# Patient Record
Sex: MALE | Race: WHITE | NOT HISPANIC OR LATINO | Employment: PART TIME | URBAN - METROPOLITAN AREA
[De-identification: names, ages, dates, MRNs, and addresses within clinical notes are randomized per-mention and may not be internally consistent; named-entity substitution may affect disease eponyms.]

---

## 2018-06-14 ENCOUNTER — HOSPITAL ENCOUNTER (EMERGENCY)
Facility: HOSPITAL | Age: 43
Discharge: HOME | End: 2018-06-15
Attending: EMERGENCY MEDICINE | Admitting: EMERGENCY MEDICINE

## 2018-06-14 DIAGNOSIS — B16.9 ACUTE HEPATITIS B: Primary | ICD-10-CM

## 2018-06-14 PROCEDURE — 99283 EMERGENCY DEPT VISIT LOW MDM: CPT

## 2018-06-14 RX ORDER — SODIUM CHLORIDE 0.9 % (FLUSH) 0.9 %
10 SYRINGE (ML) INJECTION AS NEEDED
Status: DISCONTINUED | OUTPATIENT
Start: 2018-06-14 | End: 2018-06-15 | Stop reason: HOSPADM

## 2018-06-15 ENCOUNTER — APPOINTMENT (OUTPATIENT)
Dept: ULTRASOUND IMAGING | Facility: HOSPITAL | Age: 43
End: 2018-06-15

## 2018-06-15 VITALS
BODY MASS INDEX: 40.42 KG/M2 | SYSTOLIC BLOOD PRESSURE: 124 MMHG | HEART RATE: 78 BPM | TEMPERATURE: 96.7 F | RESPIRATION RATE: 14 BRPM | DIASTOLIC BLOOD PRESSURE: 85 MMHG | OXYGEN SATURATION: 96 % | HEIGHT: 73 IN | WEIGHT: 305 LBS

## 2018-06-15 LAB
ALBUMIN SERPL-MCNC: 4 G/DL (ref 3.5–5.2)
ALBUMIN/GLOB SERPL: 1.4 G/DL
ALP SERPL-CCNC: 216 U/L (ref 39–117)
ALT SERPL W P-5'-P-CCNC: 1468 U/L (ref 1–41)
ANION GAP SERPL CALCULATED.3IONS-SCNC: 11.3 MMOL/L
APTT PPP: 30.1 SECONDS (ref 22.7–35.4)
AST SERPL-CCNC: 584 U/L (ref 1–40)
BASOPHILS # BLD AUTO: 0.02 10*3/MM3 (ref 0–0.2)
BASOPHILS NFR BLD AUTO: 0.3 % (ref 0–1.5)
BILIRUB SERPL-MCNC: 1.7 MG/DL (ref 0.1–1.2)
BILIRUB UR QL STRIP: NEGATIVE
BUN BLD-MCNC: 17 MG/DL (ref 6–20)
BUN/CREAT SERPL: 18.7 (ref 7–25)
CALCIUM SPEC-SCNC: 9.3 MG/DL (ref 8.6–10.5)
CHLORIDE SERPL-SCNC: 101 MMOL/L (ref 98–107)
CLARITY UR: CLEAR
CO2 SERPL-SCNC: 25.7 MMOL/L (ref 22–29)
COLOR UR: YELLOW
CREAT BLD-MCNC: 0.91 MG/DL (ref 0.76–1.27)
DEPRECATED RDW RBC AUTO: 45.2 FL (ref 37–54)
EOSINOPHIL # BLD AUTO: 0.1 10*3/MM3 (ref 0–0.7)
EOSINOPHIL NFR BLD AUTO: 1.5 % (ref 0.3–6.2)
ERYTHROCYTE [DISTWIDTH] IN BLOOD BY AUTOMATED COUNT: 13.6 % (ref 11.5–14.5)
GFR SERPL CREATININE-BSD FRML MDRD: 91 ML/MIN/1.73
GLOBULIN UR ELPH-MCNC: 2.8 GM/DL
GLUCOSE BLD-MCNC: 190 MG/DL (ref 65–99)
GLUCOSE UR STRIP-MCNC: ABNORMAL MG/DL
HAV IGM SERPL QL IA: ABNORMAL
HBV CORE IGM SERPL QL IA: REACTIVE
HBV SURFACE AG SERPL QL IA: REACTIVE
HCT VFR BLD AUTO: 48.3 % (ref 40.4–52.2)
HCV AB SER DONR QL: ABNORMAL
HGB BLD-MCNC: 16.4 G/DL (ref 13.7–17.6)
HGB UR QL STRIP.AUTO: NEGATIVE
IMM GRANULOCYTES # BLD: 0 10*3/MM3 (ref 0–0.03)
IMM GRANULOCYTES NFR BLD: 0 % (ref 0–0.5)
INR PPP: 1.14 (ref 0.9–1.1)
KETONES UR QL STRIP: NEGATIVE
LEUKOCYTE ESTERASE UR QL STRIP.AUTO: NEGATIVE
LYMPHOCYTES # BLD AUTO: 2.78 10*3/MM3 (ref 0.9–4.8)
LYMPHOCYTES NFR BLD AUTO: 40.5 % (ref 19.6–45.3)
MCH RBC QN AUTO: 31.2 PG (ref 27–32.7)
MCHC RBC AUTO-ENTMCNC: 34 G/DL (ref 32.6–36.4)
MCV RBC AUTO: 92 FL (ref 79.8–96.2)
MONOCYTES # BLD AUTO: 0.65 10*3/MM3 (ref 0.2–1.2)
MONOCYTES NFR BLD AUTO: 9.5 % (ref 5–12)
NEUTROPHILS # BLD AUTO: 3.31 10*3/MM3 (ref 1.9–8.1)
NEUTROPHILS NFR BLD AUTO: 48.2 % (ref 42.7–76)
NITRITE UR QL STRIP: NEGATIVE
PH UR STRIP.AUTO: 5.5 [PH] (ref 5–8)
PLATELET # BLD AUTO: 193 10*3/MM3 (ref 140–500)
PMV BLD AUTO: 9.9 FL (ref 6–12)
POTASSIUM BLD-SCNC: 4.4 MMOL/L (ref 3.5–5.2)
PROT SERPL-MCNC: 6.8 G/DL (ref 6–8.5)
PROT UR QL STRIP: NEGATIVE
PROTHROMBIN TIME: 14.4 SECONDS (ref 11.7–14.2)
RBC # BLD AUTO: 5.25 10*6/MM3 (ref 4.6–6)
SODIUM BLD-SCNC: 138 MMOL/L (ref 136–145)
SP GR UR STRIP: 1.01 (ref 1–1.03)
UROBILINOGEN UR QL STRIP: ABNORMAL
WBC NRBC COR # BLD: 6.86 10*3/MM3 (ref 4.5–10.7)

## 2018-06-15 PROCEDURE — 85025 COMPLETE CBC W/AUTO DIFF WBC: CPT | Performed by: EMERGENCY MEDICINE

## 2018-06-15 PROCEDURE — 81003 URINALYSIS AUTO W/O SCOPE: CPT | Performed by: EMERGENCY MEDICINE

## 2018-06-15 PROCEDURE — 80074 ACUTE HEPATITIS PANEL: CPT | Performed by: EMERGENCY MEDICINE

## 2018-06-15 PROCEDURE — 85610 PROTHROMBIN TIME: CPT | Performed by: EMERGENCY MEDICINE

## 2018-06-15 PROCEDURE — 80053 COMPREHEN METABOLIC PANEL: CPT | Performed by: EMERGENCY MEDICINE

## 2018-06-15 PROCEDURE — 76705 ECHO EXAM OF ABDOMEN: CPT

## 2018-06-15 PROCEDURE — 85730 THROMBOPLASTIN TIME PARTIAL: CPT | Performed by: EMERGENCY MEDICINE

## 2018-06-15 RX ORDER — ONDANSETRON 4 MG/1
4 TABLET, FILM COATED ORAL EVERY 6 HOURS
Qty: 10 TABLET | Refills: 0 | Status: SHIPPED | OUTPATIENT
Start: 2018-06-15

## 2018-06-15 NOTE — ED PROVIDER NOTES
" EMERGENCY DEPARTMENT ENCOUNTER    CHIEF COMPLAINT  Chief Complaint: fatigue, nausea  History given by: patient  History limited by: nothing  Room Number: 34/34  PMD: Antonio Murray MD      HPI:  Pt is a 42 y.o. male who presents complaining of fatigue and nausea for the last 2.5 weeks. Pt also complains of dark urine, diaphoresis, abd pain, lower back pain and bilateral flank pain but denies fever, diarrhea. Pt states that he developed vomiting earlier today, which is why he came to the ED. Pt states that he is concerned that he has Hepatitis B since his significant other was recently diagnosed with Hepatitis B. Pt states that he had the Hepatitis B vaccinations \"a long time ago\".    Duration:  2.5 weeks  Onset: gradual  Timing: constant  Location: generalized  Radiation: N/A  Quality: fatigue  Intensity/Severity: moderate  Progression: worsening  Associated Symptoms: nausea, dark urine, abd pain, diaphoresis, lower back pain, bilateral flank pain, vomiting  Aggravating Factors: none  Alleviating Factors: none  Previous Episodes: Pt denies having similar symptoms previously.  Treatment before arrival: none    PAST MEDICAL HISTORY  Active Ambulatory Problems     Diagnosis Date Noted   • No Active Ambulatory Problems     Resolved Ambulatory Problems     Diagnosis Date Noted   • No Resolved Ambulatory Problems     Past Medical History:   Diagnosis Date   • Diabetes mellitus    • Hyperlipidemia        PAST SURGICAL HISTORY  Past Surgical History:   Procedure Laterality Date   • BACK SURGERY         FAMILY HISTORY  History reviewed. No pertinent family history.    SOCIAL HISTORY  Social History     Social History   • Marital status:      Spouse name: N/A   • Number of children: N/A   • Years of education: N/A     Occupational History   • Not on file.     Social History Main Topics   • Smoking status: Current Every Day Smoker     Packs/day: 1.00   • Smokeless tobacco: Not on file   • Alcohol use No "   • Drug use: No   • Sexual activity: Not on file     Other Topics Concern   • Not on file     Social History Narrative   • No narrative on file       ALLERGIES  Codeine    REVIEW OF SYSTEMS  Review of Systems   Constitutional: Positive for diaphoresis and fatigue. Negative for activity change, appetite change and fever.   HENT: Negative for congestion and sore throat.    Eyes: Negative.    Respiratory: Negative for cough and shortness of breath.    Cardiovascular: Negative for chest pain and leg swelling.   Gastrointestinal: Positive for abdominal pain, nausea and vomiting. Negative for diarrhea.   Endocrine: Negative.    Genitourinary: Positive for flank pain (bilateral flanks). Negative for decreased urine volume and dysuria.        Dark urine   Musculoskeletal: Positive for back pain. Negative for neck pain.   Skin: Negative for rash and wound.   Allergic/Immunologic: Negative.    Neurological: Negative for weakness, numbness and headaches.   Hematological: Negative.    Psychiatric/Behavioral: Negative.    All other systems reviewed and are negative.      PHYSICAL EXAM  ED Triage Vitals   Temp Heart Rate Resp BP SpO2   06/14/18 2344 06/14/18 2344 06/14/18 2344 06/14/18 2348 06/14/18 2344   96.7 °F (35.9 °C) 91 16 130/89 96 %      Temp src Heart Rate Source Patient Position BP Location FiO2 (%)   06/14/18 2344 06/14/18 2344 -- 06/14/18 2348 --   Tympanic Monitor  Right arm        Physical Exam   Constitutional: He is oriented to person, place, and time. He does not have a sickly appearance. No distress.   HENT:   Head: Normocephalic and atraumatic.   Eyes: EOM are normal. Pupils are equal, round, and reactive to light. No scleral icterus.   Neck: Normal range of motion. Neck supple.   Cardiovascular: Normal rate, regular rhythm and normal heart sounds.    Pulmonary/Chest: Effort normal and breath sounds normal. No respiratory distress.   Abdominal: Soft. There is no tenderness. There is no rebound and no  guarding.   obese   Musculoskeletal: Normal range of motion. He exhibits no edema.   Neurological: He is alert and oriented to person, place, and time. He has normal sensation and normal strength.   Skin: Skin is warm and dry. Rash (lower abdomen c/w allergic dermatitis) noted.   Psychiatric: Mood and affect normal.   Nursing note and vitals reviewed.      LAB RESULTS  Lab Results (last 24 hours)     Procedure Component Value Units Date/Time    CBC & Differential [[id number]] Collected:  06/15/18 0003    Specimen:  Blood Updated:  06/15/18 0027    Narrative:       The following orders were created for panel order CBC & Differential.  Procedure                               Abnormality         Status                     ---------                               -----------         ------                     CBC Auto Differential[[id number]]        Normal              Final result                 Please view results for these tests on the individual orders.    Comprehensive Metabolic Panel [[id number]]  (Abnormal) Collected:  06/15/18 0003    Specimen:  Blood Updated:  06/15/18 0045     Glucose 190 (H) mg/dL      BUN 17 mg/dL      Creatinine 0.91 mg/dL      Sodium 138 mmol/L      Potassium 4.4 mmol/L      Chloride 101 mmol/L      CO2 25.7 mmol/L      Calcium 9.3 mg/dL      Total Protein 6.8 g/dL      Albumin 4.00 g/dL      ALT (SGPT) 1,468 (H) U/L      AST (SGOT) 584 (H) U/L      Alkaline Phosphatase 216 (H) U/L      Total Bilirubin 1.7 (H) mg/dL      eGFR Non African Amer 91 mL/min/1.73      Globulin 2.8 gm/dL      A/G Ratio 1.4 g/dL      BUN/Creatinine Ratio 18.7     Anion Gap 11.3 mmol/L     CBC Auto Differential [[id number]]  (Normal) Collected:  06/15/18 0003    Specimen:  Blood Updated:  06/15/18 0027     WBC 6.86 10*3/mm3      RBC 5.25 10*6/mm3      Hemoglobin 16.4 g/dL      Hematocrit 48.3 %      MCV 92.0 fL      MCH 31.2 pg      MCHC 34.0 g/dL      RDW 13.6 %      RDW-SD 45.2 fl      MPV 9.9 fL      Platelets 193  10*3/mm3      Neutrophil % 48.2 %      Lymphocyte % 40.5 %      Monocyte % 9.5 %      Eosinophil % 1.5 %      Basophil % 0.3 %      Immature Grans % 0.0 %      Neutrophils, Absolute 3.31 10*3/mm3      Lymphocytes, Absolute 2.78 10*3/mm3      Monocytes, Absolute 0.65 10*3/mm3      Eosinophils, Absolute 0.10 10*3/mm3      Basophils, Absolute 0.02 10*3/mm3      Immature Grans, Absolute 0.00 10*3/mm3     Urinalysis With / Microscopic If Indicated (No Culture) - Urine, Clean Catch [[id number]]  (Abnormal) Collected:  06/15/18 0004    Specimen:  Urine from Urine, Clean Catch Updated:  06/15/18 0050     Color, UA Yellow     Appearance, UA Clear     pH, UA 5.5     Specific Gravity, UA 1.012     Glucose,  mg/dL (2+) (A)     Ketones, UA Negative     Bilirubin, UA Negative     Blood, UA Negative     Protein, UA Negative     Leuk Esterase, UA Negative     Nitrite, UA Negative     Urobilinogen, UA 2.0 E.U./dL (A)    Narrative:       Urine microscopic not indicated.    Hepatitis Panel, Acute [[id number]] Collected:  06/15/18 0015    Specimen:  Blood Updated:  06/15/18 0042    aPTT [[id number]]  (Normal) Collected:  06/15/18 0209    Specimen:  Blood Updated:  06/15/18 0236     PTT 30.1 seconds     Protime-INR [[id number]]  (Abnormal) Collected:  06/15/18 0209    Specimen:  Blood Updated:  06/15/18 0236     Protime 14.4 (H) Seconds      INR 1.14 (H)          I ordered the above labs and reviewed the results    RADIOLOGY  US Gallbladder   Final Result   1. Heavily contracted gallbladder without obvious gallstones.   2. Diffuse fatty liver where visualized       This report was finalized on 6/15/2018 2:24 AM by Jackson Narvaez M.D.             I ordered the above noted radiological studies. Interpreted by radiologist. Reviewed by me in PACS.       PROCEDURES  Procedures      PROGRESS AND CONSULTS     0005- Discussed the plan to order lab work for further evaluation. Pt understands and agrees with the plan, all questions  answered.    0007- Ordered hepatitis panel for further evaluation.    0130- Ordered US Gallbladder for further evaluation.    0206- Ordered PTT and PT with INR for further evaluation.    0208- Placed call to [facility] for admission.    0210- Rechecked pt. Pt is resting comfortably. Notified pt of his lab and imaging results, including the pt's elevated LFTs and bilirubin. Pt states that his significant other is taking medication for her known Hepatitis B infection. Discussed the plan to discuss the pt's case with the hospitalist regarding disposition. Pt states that he would prefer to be discharged home. Pt agrees with the plan and all questions were addressed.    0214- Discussed the pt's case with Dr. Doran ([facility]), who states that the pt needs supportive care. Dr. Doran recommended calling the pt's PCP to determine if they are able to follow up closely with the pt and are able to arrange the proper follow up.    0217- Placed call to Dr. Murray (PCP) for consult.    0217- Rechecked pt. Pt is resting comfortably. Notified pt of my discussion with Dr. Doran and the plan to call Dr. Murray (PCP) to ensure that the pt can follow up in a reasonable amount of time. Pt understands and agrees with the plan, all questions answered.    0247- Discussed the pt's case with Dr. Murray (PCP) who agrees to follow up with the pt as an outpatient.    0249- Rechecked pt. Pt is resting comfortably. Notified pt of my discussion with Dr. Murray (PCP). Discussed the plan to discharge the pt home with instructions to call Dr. Murray's office later today. Pt understands and agrees with the plan, all questions answered.      MEDICAL DECISION MAKING  Results were reviewed/discussed with the patient and they were also made aware of online access. Pt also made aware that some labs, such as cultures, will not be resulted during ER visit and follow up with PMD is necessary.     MDM  Number of Diagnoses or Management Options  Acute hepatitis B:      Amount and/or  Complexity of Data Reviewed  Clinical lab tests: ordered and reviewed (LFTs are elevated)  Tests in the radiology section of CPT®: ordered and reviewed (US Gallbladder shows a heavily contracted gallbladder without obvious gallstones)  Discussion of test results with the performing providers: yes (D/w Mani (US technician))  Decide to obtain previous medical records or to obtain history from someone other than the patient: yes  Review and summarize past medical records: yes (Pt was last seen in the ED in December 2016 for abd pain. Pt was diagnosed with DM at that time, treated with IVF and discharged home with a prescription for metformin.)  Discuss the patient with other providers: yes (D/w Dr. Doran ([facility]) and Dr. Murray (PCP))  Independent visualization of images, tracings, or specimens: yes    Patient Progress  Patient progress: stable         DIAGNOSIS  Final diagnoses:   Acute hepatitis B       DISPOSITION  DISCHARGE    Patient discharged in stable condition.    Reviewed implications of results, diagnosis, meds, responsibility to follow up, warning signs and symptoms of possible worsening, potential complications and reasons to return to ER, including fever, worsening pain or any concerns.    Patient/Family voiced understanding of above instructions.    Discussed plan for discharge, as there is no emergent indication for admission. Patient referred to primary care provider for BP management due to today's BP. Pt/family is agreeable and understands need for follow up and repeat testing.  Pt is aware that discharge does not mean that nothing is wrong but it indicates no emergency is present that requires admission and they must continue care with follow-up as given below or physician of their choice.     FOLLOW-UP  Antonio Murray MD  [address] [place] [address]  [phone]    Schedule an appointment as soon as possible for a visit            Medication List      Changed    metFORMIN  500 MG tablet  Commonly known as:  GLUCOPHAGE  Take 1 tablet by mouth 2 (Two) Times a Day With Meals.  What changed:  how much to take        Stop    atorvastatin 40 MG tablet  Commonly known as:  LIPITOR     HYDROcodone-acetaminophen 5-325 MG per tablet  Commonly known as:  NORCO              Latest Documented Vital Signs:  As of 2:51 AM  BP- 124/85 HR- 78 Temp- 96.7 °F (35.9 °C) (Tympanic) O2 sat- 96%    --  Documentation assistance provided by mendoza Sharma for Dr. Parks.  Information recorded by the mendoza was done at my direction and has been verified and validated by me.       Olimpia Sharma  06/15/18 0251       Dashawn Parks MD  06/17/18 1157

## 2018-06-15 NOTE — ED TRIAGE NOTES
"Pt c/o lower abd pain, lower back pain, \"and sometimes when I pee it looks like coffee\".  " JACK (acute kidney injury)

## 2018-06-22 ENCOUNTER — ON CAMPUS - OUTPATIENT (OUTPATIENT)
Dept: URBAN - METROPOLITAN AREA HOSPITAL 108 | Facility: HOSPITAL | Age: 43
End: 2018-06-22
Payer: COMMERCIAL

## 2018-06-22 DIAGNOSIS — B16.9 ACUTE HEPATITIS B WITHOUT DELTA-AGENT AND WITHOUT HEPATIC CO: ICD-10-CM

## 2018-06-22 DIAGNOSIS — R11.0 NAUSEA: ICD-10-CM

## 2018-06-22 DIAGNOSIS — R53.81 OTHER MALAISE: ICD-10-CM

## 2018-06-22 PROCEDURE — 99222 1ST HOSP IP/OBS MODERATE 55: CPT

## 2018-06-22 PROCEDURE — 99203 OFFICE O/P NEW LOW 30 MIN: CPT

## 2022-07-01 ENCOUNTER — APPOINTMENT (OUTPATIENT)
Dept: CT IMAGING | Facility: HOSPITAL | Age: 47
DRG: 989 | End: 2022-07-01
Payer: COMMERCIAL

## 2022-07-01 ENCOUNTER — HOSPITAL ENCOUNTER (INPATIENT)
Facility: HOSPITAL | Age: 47
LOS: 1 days | Discharge: HOME | DRG: 989 | End: 2022-07-03
Attending: EMERGENCY MEDICINE | Admitting: HOSPITALIST
Payer: COMMERCIAL

## 2022-07-01 DIAGNOSIS — N49.2 SCROTAL ABSCESS: Primary | ICD-10-CM

## 2022-07-01 DIAGNOSIS — E11.65 TYPE 2 DIABETES MELLITUS WITH HYPERGLYCEMIA, WITHOUT LONG-TERM CURRENT USE OF INSULIN: ICD-10-CM

## 2022-07-01 DIAGNOSIS — E87.20 LACTIC ACID ACIDOSIS: ICD-10-CM

## 2022-07-01 LAB
ALBUMIN SERPL-MCNC: 3.9 G/DL (ref 3.5–5.2)
ALBUMIN/GLOB SERPL: 1.6 G/DL
ALP SERPL-CCNC: 167 U/L (ref 39–117)
ALT SERPL W P-5'-P-CCNC: 13 U/L (ref 1–41)
ANION GAP SERPL CALCULATED.3IONS-SCNC: 13 MMOL/L (ref 5–15)
AST SERPL-CCNC: 13 U/L (ref 1–40)
BASOPHILS # BLD AUTO: 0.03 10*3/MM3 (ref 0–0.2)
BASOPHILS NFR BLD AUTO: 0.5 % (ref 0–1.5)
BILIRUB SERPL-MCNC: 0.5 MG/DL (ref 0–1.2)
BUN SERPL-MCNC: 10 MG/DL (ref 6–20)
BUN/CREAT SERPL: 7.4 (ref 7–25)
CALCIUM SPEC-SCNC: 8.9 MG/DL (ref 8.6–10.5)
CHLORIDE SERPL-SCNC: 96 MMOL/L (ref 98–107)
CO2 SERPL-SCNC: 22 MMOL/L (ref 22–29)
CREAT SERPL-MCNC: 1.35 MG/DL (ref 0.76–1.27)
D-LACTATE SERPL-SCNC: 2.6 MMOL/L (ref 0.5–2)
DEPRECATED RDW RBC AUTO: 40.3 FL (ref 37–54)
EGFRCR SERPLBLD CKD-EPI 2021: 65.6 ML/MIN/1.73
EOSINOPHIL # BLD AUTO: 0.08 10*3/MM3 (ref 0–0.4)
EOSINOPHIL NFR BLD AUTO: 1.3 % (ref 0.3–6.2)
ERYTHROCYTE [DISTWIDTH] IN BLOOD BY AUTOMATED COUNT: 13.2 % (ref 12.3–15.4)
GLOBULIN UR ELPH-MCNC: 2.4 GM/DL
GLUCOSE BLDC GLUCOMTR-MCNC: 296 MG/DL (ref 70–130)
GLUCOSE SERPL-MCNC: 487 MG/DL (ref 65–99)
HCT VFR BLD AUTO: 45.4 % (ref 37.5–51)
HGB BLD-MCNC: 15.7 G/DL (ref 13–17.7)
IMM GRANULOCYTES # BLD AUTO: 0.03 10*3/MM3 (ref 0–0.05)
IMM GRANULOCYTES NFR BLD AUTO: 0.5 % (ref 0–0.5)
LYMPHOCYTES # BLD AUTO: 1.74 10*3/MM3 (ref 0.7–3.1)
LYMPHOCYTES NFR BLD AUTO: 27.4 % (ref 19.6–45.3)
MCH RBC QN AUTO: 29.8 PG (ref 26.6–33)
MCHC RBC AUTO-ENTMCNC: 34.6 G/DL (ref 31.5–35.7)
MCV RBC AUTO: 86.1 FL (ref 79–97)
MONOCYTES # BLD AUTO: 0.56 10*3/MM3 (ref 0.1–0.9)
MONOCYTES NFR BLD AUTO: 8.8 % (ref 5–12)
NEUTROPHILS NFR BLD AUTO: 3.9 10*3/MM3 (ref 1.7–7)
NEUTROPHILS NFR BLD AUTO: 61.5 % (ref 42.7–76)
NRBC BLD AUTO-RTO: 0.2 /100 WBC (ref 0–0.2)
PLATELET # BLD AUTO: 203 10*3/MM3 (ref 140–450)
PMV BLD AUTO: 10 FL (ref 6–12)
POTASSIUM SERPL-SCNC: 4.1 MMOL/L (ref 3.5–5.2)
PROT SERPL-MCNC: 6.3 G/DL (ref 6–8.5)
RBC # BLD AUTO: 5.27 10*6/MM3 (ref 4.14–5.8)
SARS-COV-2 RNA PNL SPEC NAA+PROBE: NOT DETECTED
SODIUM SERPL-SCNC: 131 MMOL/L (ref 136–145)
WBC NRBC COR # BLD: 6.34 10*3/MM3 (ref 3.4–10.8)

## 2022-07-01 PROCEDURE — 25010000002 ONDANSETRON PER 1 MG: Performed by: EMERGENCY MEDICINE

## 2022-07-01 PROCEDURE — 87070 CULTURE OTHR SPECIMN AEROBIC: CPT | Performed by: PHYSICIAN ASSISTANT

## 2022-07-01 PROCEDURE — 25010000002 VANCOMYCIN 10 G RECONSTITUTED SOLUTION: Performed by: EMERGENCY MEDICINE

## 2022-07-01 PROCEDURE — 25010000002 PIPERACILLIN SOD-TAZOBACTAM PER 1 G: Performed by: EMERGENCY MEDICINE

## 2022-07-01 PROCEDURE — 87040 BLOOD CULTURE FOR BACTERIA: CPT | Performed by: EMERGENCY MEDICINE

## 2022-07-01 PROCEDURE — 82962 GLUCOSE BLOOD TEST: CPT

## 2022-07-01 PROCEDURE — 99284 EMERGENCY DEPT VISIT MOD MDM: CPT

## 2022-07-01 PROCEDURE — 83605 ASSAY OF LACTIC ACID: CPT | Performed by: EMERGENCY MEDICINE

## 2022-07-01 PROCEDURE — 25010000002 HYDROMORPHONE PER 4 MG: Performed by: EMERGENCY MEDICINE

## 2022-07-01 PROCEDURE — 87635 SARS-COV-2 COVID-19 AMP PRB: CPT | Performed by: PHYSICIAN ASSISTANT

## 2022-07-01 PROCEDURE — 25010000002 IOPAMIDOL 61 % SOLUTION: Performed by: EMERGENCY MEDICINE

## 2022-07-01 PROCEDURE — 85025 COMPLETE CBC W/AUTO DIFF WBC: CPT | Performed by: PHYSICIAN ASSISTANT

## 2022-07-01 PROCEDURE — 0V950ZZ DRAINAGE OF SCROTUM, OPEN APPROACH: ICD-10-PCS | Performed by: PHYSICIAN ASSISTANT

## 2022-07-01 PROCEDURE — 87205 SMEAR GRAM STAIN: CPT | Performed by: PHYSICIAN ASSISTANT

## 2022-07-01 PROCEDURE — 74177 CT ABD & PELVIS W/CONTRAST: CPT

## 2022-07-01 PROCEDURE — 63710000001 INSULIN REGULAR HUMAN PER 5 UNITS: Performed by: PHYSICIAN ASSISTANT

## 2022-07-01 PROCEDURE — 80053 COMPREHEN METABOLIC PANEL: CPT | Performed by: PHYSICIAN ASSISTANT

## 2022-07-01 PROCEDURE — 87147 CULTURE TYPE IMMUNOLOGIC: CPT | Performed by: PHYSICIAN ASSISTANT

## 2022-07-01 RX ORDER — OMEPRAZOLE 20 MG/1
20 CAPSULE, DELAYED RELEASE ORAL DAILY
COMMUNITY

## 2022-07-01 RX ORDER — HYDROMORPHONE HYDROCHLORIDE 1 MG/ML
0.5 INJECTION, SOLUTION INTRAMUSCULAR; INTRAVENOUS; SUBCUTANEOUS ONCE
Status: COMPLETED | OUTPATIENT
Start: 2022-07-01 | End: 2022-07-01

## 2022-07-01 RX ORDER — LIDOCAINE HYDROCHLORIDE AND EPINEPHRINE 10; 10 MG/ML; UG/ML
10 INJECTION, SOLUTION INFILTRATION; PERINEURAL ONCE
Status: COMPLETED | OUTPATIENT
Start: 2022-07-01 | End: 2022-07-01

## 2022-07-01 RX ORDER — ONDANSETRON 2 MG/ML
4 INJECTION INTRAMUSCULAR; INTRAVENOUS ONCE
Status: COMPLETED | OUTPATIENT
Start: 2022-07-01 | End: 2022-07-01

## 2022-07-01 RX ADMIN — TAZOBACTAM SODIUM AND PIPERACILLIN SODIUM 3.38 G: 375; 3 INJECTION, SOLUTION INTRAVENOUS at 22:10

## 2022-07-01 RX ADMIN — IOPAMIDOL 85 ML: 612 INJECTION, SOLUTION INTRAVENOUS at 22:37

## 2022-07-01 RX ADMIN — LIDOCAINE HYDROCHLORIDE,EPINEPHRINE BITARTRATE 10 ML: 10; .01 INJECTION, SOLUTION INFILTRATION; PERINEURAL at 22:57

## 2022-07-01 RX ADMIN — ONDANSETRON 4 MG: 2 INJECTION INTRAMUSCULAR; INTRAVENOUS at 21:59

## 2022-07-01 RX ADMIN — VANCOMYCIN HYDROCHLORIDE 2500 MG: 10 INJECTION, POWDER, LYOPHILIZED, FOR SOLUTION INTRAVENOUS at 22:57

## 2022-07-01 RX ADMIN — HYDROMORPHONE HYDROCHLORIDE 0.5 MG: 1 INJECTION, SOLUTION INTRAMUSCULAR; INTRAVENOUS; SUBCUTANEOUS at 22:01

## 2022-07-01 RX ADMIN — INSULIN HUMAN 10 UNITS: 100 INJECTION, SOLUTION PARENTERAL at 23:17

## 2022-07-01 RX ADMIN — HYDROMORPHONE HYDROCHLORIDE 0.5 MG: 1 INJECTION, SOLUTION INTRAMUSCULAR; INTRAVENOUS; SUBCUTANEOUS at 23:40

## 2022-07-01 RX ADMIN — SODIUM CHLORIDE 1000 ML: 9 INJECTION, SOLUTION INTRAVENOUS at 23:03

## 2022-07-02 PROBLEM — N49.2 SCROTAL ABSCESS: Status: ACTIVE | Noted: 2022-07-02

## 2022-07-02 PROBLEM — E11.65 TYPE 2 DIABETES MELLITUS WITH HYPERGLYCEMIA, WITHOUT LONG-TERM CURRENT USE OF INSULIN: Status: ACTIVE | Noted: 2022-07-02

## 2022-07-02 PROBLEM — E87.20 LACTIC ACID ACIDOSIS: Status: ACTIVE | Noted: 2022-07-02

## 2022-07-02 LAB
ANION GAP SERPL CALCULATED.3IONS-SCNC: 9 MMOL/L (ref 5–15)
BUN SERPL-MCNC: 10 MG/DL (ref 6–20)
BUN/CREAT SERPL: 11.6 (ref 7–25)
CALCIUM SPEC-SCNC: 8.5 MG/DL (ref 8.6–10.5)
CHLORIDE SERPL-SCNC: 102 MMOL/L (ref 98–107)
CO2 SERPL-SCNC: 25 MMOL/L (ref 22–29)
CREAT SERPL-MCNC: 0.86 MG/DL (ref 0.76–1.27)
D-LACTATE SERPL-SCNC: 1.8 MMOL/L (ref 0.5–2)
DEPRECATED RDW RBC AUTO: 40.2 FL (ref 37–54)
EGFRCR SERPLBLD CKD-EPI 2021: 108.1 ML/MIN/1.73
ERYTHROCYTE [DISTWIDTH] IN BLOOD BY AUTOMATED COUNT: 13 % (ref 12.3–15.4)
GLUCOSE BLDC GLUCOMTR-MCNC: 257 MG/DL (ref 70–130)
GLUCOSE BLDC GLUCOMTR-MCNC: 349 MG/DL (ref 70–130)
GLUCOSE BLDC GLUCOMTR-MCNC: 352 MG/DL (ref 70–130)
GLUCOSE SERPL-MCNC: 270 MG/DL (ref 65–99)
HBA1C MFR BLD: 12.5 % (ref 4.8–5.6)
HCT VFR BLD AUTO: 42.7 % (ref 37.5–51)
HGB BLD-MCNC: 14.6 G/DL (ref 13–17.7)
MCH RBC QN AUTO: 29.5 PG (ref 26.6–33)
MCHC RBC AUTO-ENTMCNC: 34.2 G/DL (ref 31.5–35.7)
MCV RBC AUTO: 86.3 FL (ref 79–97)
PLATELET # BLD AUTO: 190 10*3/MM3 (ref 140–450)
PMV BLD AUTO: 10.2 FL (ref 6–12)
POTASSIUM SERPL-SCNC: 4.2 MMOL/L (ref 3.5–5.2)
RBC # BLD AUTO: 4.95 10*6/MM3 (ref 4.14–5.8)
SODIUM SERPL-SCNC: 136 MMOL/L (ref 136–145)
WBC NRBC COR # BLD: 5.84 10*3/MM3 (ref 3.4–10.8)

## 2022-07-02 PROCEDURE — 80048 BASIC METABOLIC PNL TOTAL CA: CPT | Performed by: NURSE PRACTITIONER

## 2022-07-02 PROCEDURE — 82962 GLUCOSE BLOOD TEST: CPT

## 2022-07-02 PROCEDURE — 25010000002 PIPERACILLIN SOD-TAZOBACTAM PER 1 G: Performed by: NURSE PRACTITIONER

## 2022-07-02 PROCEDURE — 83036 HEMOGLOBIN GLYCOSYLATED A1C: CPT | Performed by: NURSE PRACTITIONER

## 2022-07-02 PROCEDURE — 85027 COMPLETE CBC AUTOMATED: CPT | Performed by: NURSE PRACTITIONER

## 2022-07-02 PROCEDURE — 63710000001 INSULIN LISPRO (HUMAN) PER 5 UNITS: Performed by: NURSE PRACTITIONER

## 2022-07-02 PROCEDURE — 36415 COLL VENOUS BLD VENIPUNCTURE: CPT | Performed by: NURSE PRACTITIONER

## 2022-07-02 PROCEDURE — 25010000002 MORPHINE PER 10 MG: Performed by: NURSE PRACTITIONER

## 2022-07-02 RX ORDER — ATORVASTATIN CALCIUM 20 MG/1
40 TABLET, FILM COATED ORAL DAILY
Status: DISCONTINUED | OUTPATIENT
Start: 2022-07-02 | End: 2022-07-03 | Stop reason: HOSPADM

## 2022-07-02 RX ORDER — HYDROCODONE BITARTRATE AND ACETAMINOPHEN 5; 325 MG/1; MG/1
1 TABLET ORAL EVERY 4 HOURS PRN
Status: DISCONTINUED | OUTPATIENT
Start: 2022-07-02 | End: 2022-07-03 | Stop reason: HOSPADM

## 2022-07-02 RX ORDER — ONDANSETRON 4 MG/1
4 TABLET, FILM COATED ORAL EVERY 6 HOURS PRN
Status: DISCONTINUED | OUTPATIENT
Start: 2022-07-02 | End: 2022-07-03 | Stop reason: HOSPADM

## 2022-07-02 RX ORDER — ACETAMINOPHEN 650 MG/1
650 SUPPOSITORY RECTAL EVERY 4 HOURS PRN
Status: DISCONTINUED | OUTPATIENT
Start: 2022-07-02 | End: 2022-07-03 | Stop reason: HOSPADM

## 2022-07-02 RX ORDER — SODIUM CHLORIDE 0.9 % (FLUSH) 0.9 %
10 SYRINGE (ML) INJECTION EVERY 12 HOURS SCHEDULED
Status: DISCONTINUED | OUTPATIENT
Start: 2022-07-02 | End: 2022-07-03 | Stop reason: HOSPADM

## 2022-07-02 RX ORDER — SODIUM CHLORIDE 0.9 % (FLUSH) 0.9 %
10 SYRINGE (ML) INJECTION AS NEEDED
Status: DISCONTINUED | OUTPATIENT
Start: 2022-07-02 | End: 2022-07-03 | Stop reason: HOSPADM

## 2022-07-02 RX ORDER — PANTOPRAZOLE SODIUM 40 MG/1
40 TABLET, DELAYED RELEASE ORAL EVERY MORNING
Status: DISCONTINUED | OUTPATIENT
Start: 2022-07-03 | End: 2022-07-03 | Stop reason: HOSPADM

## 2022-07-02 RX ORDER — DEXTROSE MONOHYDRATE 25 G/50ML
25 INJECTION, SOLUTION INTRAVENOUS
Status: DISCONTINUED | OUTPATIENT
Start: 2022-07-02 | End: 2022-07-03 | Stop reason: HOSPADM

## 2022-07-02 RX ORDER — ACETAMINOPHEN 160 MG/5ML
650 SOLUTION ORAL EVERY 4 HOURS PRN
Status: DISCONTINUED | OUTPATIENT
Start: 2022-07-02 | End: 2022-07-03 | Stop reason: HOSPADM

## 2022-07-02 RX ORDER — CALCIUM CARBONATE 500 MG/1
2 TABLET, CHEWABLE ORAL 2 TIMES DAILY PRN
Status: DISCONTINUED | OUTPATIENT
Start: 2022-07-02 | End: 2022-07-03 | Stop reason: HOSPADM

## 2022-07-02 RX ORDER — INSULIN LISPRO 100 [IU]/ML
0-14 INJECTION, SOLUTION INTRAVENOUS; SUBCUTANEOUS
Status: DISCONTINUED | OUTPATIENT
Start: 2022-07-02 | End: 2022-07-03 | Stop reason: HOSPADM

## 2022-07-02 RX ORDER — SODIUM CHLORIDE 9 MG/ML
100 INJECTION, SOLUTION INTRAVENOUS CONTINUOUS
Status: DISCONTINUED | OUTPATIENT
Start: 2022-07-02 | End: 2022-07-03 | Stop reason: HOSPADM

## 2022-07-02 RX ORDER — ONDANSETRON 4 MG/1
4 TABLET, FILM COATED ORAL 4 TIMES DAILY PRN
Status: DISCONTINUED | OUTPATIENT
Start: 2022-07-02 | End: 2022-07-02 | Stop reason: SDUPTHER

## 2022-07-02 RX ORDER — NICOTINE POLACRILEX 4 MG
15 LOZENGE BUCCAL
Status: DISCONTINUED | OUTPATIENT
Start: 2022-07-02 | End: 2022-07-03 | Stop reason: HOSPADM

## 2022-07-02 RX ORDER — VANCOMYCIN HYDROCHLORIDE 1 G/200ML
1000 INJECTION, SOLUTION INTRAVENOUS EVERY 12 HOURS
Status: DISCONTINUED | OUTPATIENT
Start: 2022-07-02 | End: 2022-07-02

## 2022-07-02 RX ORDER — ACETAMINOPHEN 325 MG/1
650 TABLET ORAL EVERY 4 HOURS PRN
Status: DISCONTINUED | OUTPATIENT
Start: 2022-07-02 | End: 2022-07-03 | Stop reason: HOSPADM

## 2022-07-02 RX ORDER — ONDANSETRON 2 MG/ML
4 INJECTION INTRAMUSCULAR; INTRAVENOUS EVERY 6 HOURS PRN
Status: DISCONTINUED | OUTPATIENT
Start: 2022-07-02 | End: 2022-07-03 | Stop reason: HOSPADM

## 2022-07-02 RX ORDER — MORPHINE SULFATE 2 MG/ML
2 INJECTION, SOLUTION INTRAMUSCULAR; INTRAVENOUS
Status: DISCONTINUED | OUTPATIENT
Start: 2022-07-02 | End: 2022-07-03 | Stop reason: HOSPADM

## 2022-07-02 RX ORDER — ATORVASTATIN CALCIUM 20 MG/1
40 TABLET, FILM COATED ORAL DAILY
COMMUNITY

## 2022-07-02 RX ORDER — LISINOPRIL 5 MG/1
5 TABLET ORAL DAILY
Status: DISCONTINUED | OUTPATIENT
Start: 2022-07-02 | End: 2022-07-03 | Stop reason: HOSPADM

## 2022-07-02 RX ADMIN — EMPAGLIFLOZIN 25 MG: 25 TABLET, FILM COATED ORAL at 16:48

## 2022-07-02 RX ADMIN — LISINOPRIL 5 MG: 5 TABLET ORAL at 16:48

## 2022-07-02 RX ADMIN — INSULIN LISPRO 8 UNITS: 100 INJECTION, SOLUTION INTRAVENOUS; SUBCUTANEOUS at 17:44

## 2022-07-02 RX ADMIN — TAZOBACTAM SODIUM AND PIPERACILLIN SODIUM 3.38 G: 375; 3 INJECTION, SOLUTION INTRAVENOUS at 17:43

## 2022-07-02 RX ADMIN — ACETAMINOPHEN 650 MG: 325 TABLET ORAL at 08:46

## 2022-07-02 RX ADMIN — INSULIN LISPRO 12 UNITS: 100 INJECTION, SOLUTION INTRAVENOUS; SUBCUTANEOUS at 13:34

## 2022-07-02 RX ADMIN — HYDROCODONE BITARTRATE AND ACETAMINOPHEN 1 TABLET: 5; 325 TABLET ORAL at 13:40

## 2022-07-02 RX ADMIN — TAZOBACTAM SODIUM AND PIPERACILLIN SODIUM 3.38 G: 375; 3 INJECTION, SOLUTION INTRAVENOUS at 22:26

## 2022-07-02 RX ADMIN — MORPHINE SULFATE 2 MG: 2 INJECTION, SOLUTION INTRAMUSCULAR; INTRAVENOUS at 08:46

## 2022-07-02 RX ADMIN — Medication 10 ML: at 08:47

## 2022-07-02 RX ADMIN — ATORVASTATIN CALCIUM 40 MG: 20 TABLET, FILM COATED ORAL at 13:34

## 2022-07-02 RX ADMIN — HYDROCODONE BITARTRATE AND ACETAMINOPHEN 1 TABLET: 5; 325 TABLET ORAL at 22:29

## 2022-07-02 RX ADMIN — SODIUM CHLORIDE 100 ML/HR: 9 INJECTION, SOLUTION INTRAVENOUS at 02:49

## 2022-07-02 RX ADMIN — MORPHINE SULFATE 2 MG: 2 INJECTION, SOLUTION INTRAMUSCULAR; INTRAVENOUS at 02:49

## 2022-07-02 RX ADMIN — Medication 10 ML: at 20:15

## 2022-07-02 RX ADMIN — TAZOBACTAM SODIUM AND PIPERACILLIN SODIUM 3.38 G: 375; 3 INJECTION, SOLUTION INTRAVENOUS at 09:11

## 2022-07-02 RX ADMIN — HYDROCODONE BITARTRATE AND ACETAMINOPHEN 1 TABLET: 5; 325 TABLET ORAL at 17:44

## 2022-07-02 RX ADMIN — INSULIN LISPRO 10 UNITS: 100 INJECTION, SOLUTION INTRAVENOUS; SUBCUTANEOUS at 09:11

## 2022-07-02 NOTE — PROGRESS NOTES
[facility] Clinical Pharmacy Services: Piperacillin-Tazobactam Consult    Pt Name: Dino Dai   : 1975    Indication: SSTI    Relevant clinical data and objective history reviewed:    Past Medical History:   Diagnosis Date    Diabetes mellitus (HCC)     Hyperlipidemia      Creatinine   Date Value Ref Range Status   2022 1.35 (H) 0.76 - 1.27 mg/dL Final   2021 1.19 (H) 0.73 - 1.18 mg/dL Final   2020 0.9 0.7 - 1.5 mg/dL Final   2018 0.8 0.7 - 1.5 mg/dL Final     BUN   Date Value Ref Range Status   2022 10 6 - 20 mg/dL Final   2021 23 (H) 9 - 21 mg/dL Final     Estimated Creatinine Clearance: 93.5 mL/min (A) (by C-G formula based on SCr of 1.35 mg/dL (H)).    Lab Results   Component Value Date    WBC 6.34 2022     Temp Readings from Last 3 Encounters:   22 99 °F (37.2 °C) (Oral)   16 97.2 °F (36.2 °C) (Tympanic)      Assessment/Plan  Estimated CrCl >20 mL/min at this time; BMI 35.62 kg/m2  Will start piperacillin-tazobactam 3.375 g IV every 8 hours     Pharmacy will continue to follow daily while on piperacillin-tazobactam and adjust as needed. Thank you for this consult.    Raul Choudhury [facility]  Clinical Pharmacist

## 2022-07-02 NOTE — ED TRIAGE NOTES
Patient to ED from home with complaint of testicular swelling since Monday, lanced it Tuesday by self. Abscess continues to drain. Patient is a  and just got home tonight from a job.

## 2022-07-02 NOTE — CONSULTS
Urology Consult  Patient Identification:  Name: Dino Dai  Age: 46 y.o.  Sex: male  :  1975  MRN: [id number]                       Chief Complaint:  Scrotal abscess    History of Present Illness: 47 yo presented to ER last pm w 1 e h/o worsening scrotal abscess. ER team performed I&D in ER and admitted to medicine.  consulted.       Problem List:  Active Hospital Problems    Diagnosis  POA   • **Scrotal abscess [N49.2]  Yes   • Lactic acid acidosis [E87.2]  Yes   • Type 2 diabetes mellitus with hyperglycemia, without long-term current use of insulin (HCC) [E11.65]  Yes   • Hyperlipidemia [E78.5]  Yes     Past Medical History:  Past Medical History:   Diagnosis Date   • Diabetes mellitus (HCC)    • Hyperlipidemia      Past Surgical History:  Past Surgical History:   Procedure Laterality Date   • BACK SURGERY        Home Meds:  Medications Prior to Admission   Medication Sig Dispense Refill Last Dose   • LISINOPRIL PO Take 5 mg by mouth Daily.   2022 at Unknown time   • metFORMIN (GLUCOPHAGE) 500 MG tablet Take 1 tablet by mouth 2 (Two) Times a Day With Meals. (Patient taking differently: Take 1,000 mg by mouth 2 (Two) Times a Day With Meals.) 20 tablet 0 2022 at Unknown time   • omeprazole (priLOSEC) 20 MG capsule Take 20 mg by mouth Daily.   2022 at Unknown time   • atorvastatin (LIPITOR) 20 MG tablet Take 40 mg by mouth Daily.      • Ertugliflozin L-PyroglutamicAc (Steglatro) 15 MG tablet Take 15 mg by mouth Every Morning.      • ondansetron (ZOFRAN) 4 MG tablet Take 1 tablet by mouth Every 6 (Six) Hours. (Patient taking differently: Take 4 mg by mouth 4 (Four) Times a Day As Needed.) 10 tablet 0 More than a month at Unknown time     Current Meds:   Current Facility-Administered Medications   Medication Dose Route Frequency Provider Last Rate Last Admin   • acetaminophen (TYLENOL) tablet 650 mg  650 mg Oral Q4H PRN Araceli Damon APRN   650 mg at 22 0846    Or   •  acetaminophen (TYLENOL) 160 MG/5ML solution 650 mg  650 mg Oral Q4H PRN Araceli Damon APRN        Or   • acetaminophen (TYLENOL) suppository 650 mg  650 mg Rectal Q4H PRN Araceli Damon APRN       • atorvastatin (LIPITOR) tablet 40 mg  40 mg Oral Daily Tj Cerrato MD   40 mg at 07/02/22 1334   • calcium carbonate (TUMS) chewable tablet 500 mg (200 mg elemental)  2 tablet Oral BID PRN Araceli Damon APRN       • dextrose (D50W) (25 g/50 mL) IV injection 25 g  25 g Intravenous Q15 Min PRN Araceli Damon APRN       • dextrose (GLUTOSE) oral gel 15 g  15 g Oral Q15 Min PRN Araceli Damon APRN       • empagliflozin (JARDIANCE) tablet 25 mg  25 mg Oral Daily Tj Cerrato MD       • glucagon (human recombinant) (GLUCAGEN DIAGNOSTIC) injection 1 mg  1 mg Subcutaneous PRN Araceli Damon APRN       • HYDROcodone-acetaminophen (NORCO) 5-325 MG per tablet 1 tablet  1 tablet Oral Q4H PRN Tj Cerrato MD   1 tablet at 07/02/22 1340   • insulin lispro (ADMELOG) injection 0-14 Units  0-14 Units Subcutaneous TID AC Araceli Damon APRN   12 Units at 07/02/22 1334   • lisinopril (PRINIVIL,ZESTRIL) tablet 5 mg  5 mg Oral Daily Tj Cerrato MD       • metFORMIN (GLUCOPHAGE) tablet 500 mg  500 mg Oral BID With Meals Tj Cerrato MD       • morphine injection 2 mg  2 mg Intravenous Q3H PRN Araceli Damon APRN   2 mg at 07/02/22 0846   • ondansetron (ZOFRAN) tablet 4 mg  4 mg Oral Q6H PRN Araceli Damon APRN        Or   • ondansetron (ZOFRAN) injection 4 mg  4 mg Intravenous Q6H PRN Araceli Damon APRN       • [START ON 7/3/2022] pantoprazole (PROTONIX) EC tablet 40 mg  40 mg Oral QAM Tj Cerrato MD       • Pharmacy to Dose Zosyn   Does not apply Continuous PRN Araceli Damon APRN       • piperacillin-tazobactam (ZOSYN) 3.375 g in iso-osmotic dextrose 50 ml (premix)  3.375 g Intravenous Q8H Araceli Damon APRN   3.375 g at 07/02/22 0911   •  "sodium chloride 0.9 % flush 10 mL  10 mL Intravenous Q12H Araceli Damon APRN   10 mL at 22 0847   • sodium chloride 0.9 % flush 10 mL  10 mL Intravenous PRN Araceli Damon APRN       • sodium chloride 0.9 % infusion  100 mL/hr Intravenous Continuous Araceli Damon APRN 100 mL/hr at 22 0249 100 mL/hr at 22 0249     Allergies:  Allergies   Allergen Reactions   • Codeine      Immunizations:    There is no immunization history on file for this patient.  Social History:   Social History     Tobacco Use   • Smoking status: Current Every Day Smoker     Packs/day: 1.00   • Smokeless tobacco: Not on file   Substance Use Topics   • Alcohol use: No      Family History:  History reviewed. No pertinent family history.     Review of Systems  negative 12 point system review except:as above    Objective:  tMax 24 hrs: Temp (24hrs), Av.4 °F (36.9 °C), Min:97.9 °F (36.6 °C), Max:99 °F (37.2 °C)    Vitals Ranges:   Temp:  [97.9 °F (36.6 °C)-99 °F (37.2 °C)] 98.4 °F (36.9 °C)  Heart Rate:  [] 79  Resp:  [16-18] 18  BP: (104-147)/(63-89) 105/63  Intake and Output Last 3 Shifts:   No intake/output data recorded.    Exam:  /63 (BP Location: Left arm, Patient Position: Lying)   Pulse 79   Temp 98.4 °F (36.9 °C) (Oral)   Resp 18   Ht 185.4 cm (73\")   Wt 122 kg (270 lb)   SpO2 90%   BMI 35.62 kg/m²      GENERAL:    Alert, cooperative, no distress, appears stated age   Head:    Normocephalic, without obvious abnormality, atraumatic   Ears:    Normal external inspection, NL hearing   Throat:   Lips, mucosa, and tongue normal   Back:     No CVA tenderness   Lungs:     Respirations unlabored;Normal palpation   CV;   Regular rate and rhythm, No edema   Abdomen:     Soft, nontender,  no masses   :    Scrotum soft,nontender, indurated where I&D performed, mild penile edema and blister on glans.    Musculoskeletal:   Extremities normal,Gait Normal   Neurologic/Psych:   Orientation " Normal; Mood/Affect Normal       Data Review:   Admission on 07/01/2022   Component Date Value Ref Range Status   • Lactate 07/01/2022 2.6 (A) 0.5 - 2.0 mmol/L Final   • Glucose 07/01/2022 487 (A) 65 - 99 mg/dL Final   • BUN 07/01/2022 10  6 - 20 mg/dL Final   • Creatinine 07/01/2022 1.35 (A) 0.76 - 1.27 mg/dL Final   • Sodium 07/01/2022 131 (A) 136 - 145 mmol/L Final   • Potassium 07/01/2022 4.1  3.5 - 5.2 mmol/L Final    Slight hemolysis detected by analyzer. Results may be affected.   • Chloride 07/01/2022 96 (A) 98 - 107 mmol/L Final   • CO2 07/01/2022 22.0  22.0 - 29.0 mmol/L Final   • Calcium 07/01/2022 8.9  8.6 - 10.5 mg/dL Final   • Total Protein 07/01/2022 6.3  6.0 - 8.5 g/dL Final   • Albumin 07/01/2022 3.90  3.50 - 5.20 g/dL Final   • ALT (SGPT) 07/01/2022 13  1 - 41 U/L Final   • AST (SGOT) 07/01/2022 13  1 - 40 U/L Final    Slight hemolysis detected by analyzer. Results may be affected.   • Alkaline Phosphatase 07/01/2022 167 (A) 39 - 117 U/L Final   • Total Bilirubin 07/01/2022 0.5  0.0 - 1.2 mg/dL Final   • Globulin 07/01/2022 2.4  gm/dL Final   • A/G Ratio 07/01/2022 1.6  g/dL Final   • BUN/Creatinine Ratio 07/01/2022 7.4  7.0 - 25.0 Final   • Anion Gap 07/01/2022 13.0  5.0 - 15.0 mmol/L Final   • eGFR 07/01/2022 65.6  >60.0 mL/min/1.73 Final    National Kidney Foundation and American Society of Nephrology (ASN) Task Force recommended calculation based on the Chronic Kidney Disease Epidemiology Collaboration (CKD-EPI) equation refit without adjustment for race.   • Wound Culture 07/01/2022 Heavy growth (4+) Streptococcus agalactiae (Group B) (A)  Preliminary    This organism is considered to be universally susceptible to penicillin.  No further antibiotic testing will be performed. If Clindamycin or Erythromycin is the drug of choice, notify the laboratory within 7 days to request susceptibility testing.   • Gram Stain 07/01/2022 Many (4+) WBCs per low power field   Preliminary   • Gram Stain  07/01/2022 Moderate (3+) Gram positive cocci   Preliminary   • WBC 07/01/2022 6.34  3.40 - 10.80 10*3/mm3 Final   • RBC 07/01/2022 5.27  4.14 - 5.80 10*6/mm3 Final   • Hemoglobin 07/01/2022 15.7  13.0 - 17.7 g/dL Final   • Hematocrit 07/01/2022 45.4  37.5 - 51.0 % Final   • MCV 07/01/2022 86.1  79.0 - 97.0 fL Final   • MCH 07/01/2022 29.8  26.6 - 33.0 pg Final   • MCHC 07/01/2022 34.6  31.5 - 35.7 g/dL Final   • RDW 07/01/2022 13.2  12.3 - 15.4 % Final   • RDW-SD 07/01/2022 40.3  37.0 - 54.0 fl Final   • MPV 07/01/2022 10.0  6.0 - 12.0 fL Final   • Platelets 07/01/2022 203  140 - 450 10*3/mm3 Final   • Neutrophil % 07/01/2022 61.5  42.7 - 76.0 % Final   • Lymphocyte % 07/01/2022 27.4  19.6 - 45.3 % Final   • Monocyte % 07/01/2022 8.8  5.0 - 12.0 % Final   • Eosinophil % 07/01/2022 1.3  0.3 - 6.2 % Final   • Basophil % 07/01/2022 0.5  0.0 - 1.5 % Final   • Immature Grans % 07/01/2022 0.5  0.0 - 0.5 % Final   • Neutrophils, Absolute 07/01/2022 3.90  1.70 - 7.00 10*3/mm3 Final   • Lymphocytes, Absolute 07/01/2022 1.74  0.70 - 3.10 10*3/mm3 Final   • Monocytes, Absolute 07/01/2022 0.56  0.10 - 0.90 10*3/mm3 Final   • Eosinophils, Absolute 07/01/2022 0.08  0.00 - 0.40 10*3/mm3 Final   • Basophils, Absolute 07/01/2022 0.03  0.00 - 0.20 10*3/mm3 Final   • Immature Grans, Absolute 07/01/2022 0.03  0.00 - 0.05 10*3/mm3 Final   • nRBC 07/01/2022 0.2  0.0 - 0.2 /100 WBC Final   • COVID19 07/01/2022 Not Detected  Not Detected - Ref. Range Final   • Lactate 07/01/2022 1.8  0.5 - 2.0 mmol/L Final   • Glucose 07/01/2022 296 (A) 70 - 130 mg/dL Final    Meter: [id number] : [id number] [facility]   • Glucose 07/02/2022 270 (A) 65 - 99 mg/dL Final   • BUN 07/02/2022 10  6 - 20 mg/dL Final   • Creatinine 07/02/2022 0.86  0.76 - 1.27 mg/dL Final   • Sodium 07/02/2022 136  136 - 145 mmol/L Final   • Potassium 07/02/2022 4.2  3.5 - 5.2 mmol/L Final   • Chloride 07/02/2022 102  98 - 107 mmol/L Final   • CO2 07/02/2022 25.0   22.0 - 29.0 mmol/L Final   • Calcium 07/02/2022 8.5 (A) 8.6 - 10.5 mg/dL Final   • BUN/Creatinine Ratio 07/02/2022 11.6  7.0 - 25.0 Final   • Anion Gap 07/02/2022 9.0  5.0 - 15.0 mmol/L Final   • eGFR 07/02/2022 108.1  >60.0 mL/min/1.73 Final    National Kidney Foundation and American Society of Nephrology (ASN) Task Force recommended calculation based on the Chronic Kidney Disease Epidemiology Collaboration (CKD-EPI) equation refit without adjustment for race.   • WBC 07/02/2022 5.84  3.40 - 10.80 10*3/mm3 Final   • RBC 07/02/2022 4.95  4.14 - 5.80 10*6/mm3 Final   • Hemoglobin 07/02/2022 14.6  13.0 - 17.7 g/dL Final   • Hematocrit 07/02/2022 42.7  37.5 - 51.0 % Final   • MCV 07/02/2022 86.3  79.0 - 97.0 fL Final   • MCH 07/02/2022 29.5  26.6 - 33.0 pg Final   • MCHC 07/02/2022 34.2  31.5 - 35.7 g/dL Final   • RDW 07/02/2022 13.0  12.3 - 15.4 % Final   • RDW-SD 07/02/2022 40.2  37.0 - 54.0 fl Final   • MPV 07/02/2022 10.2  6.0 - 12.0 fL Final   • Platelets 07/02/2022 190  140 - 450 10*3/mm3 Final   • Hemoglobin A1C 07/02/2022 12.50 (A) 4.80 - 5.60 % Final   • Glucose 07/02/2022 349 (A) 70 - 130 mg/dL Final    Meter: [id number] : [id number] Gina Wright RN   • Glucose 07/02/2022 352 (A) 70 - 130 mg/dL Final    Meter: [id number] : [id number] Elisa RUBALCAVA       No results found.      Assessment:   Scrotal abscess, now drained, wound packed, looks good.      Plan:   IV abx convert to po per med team would treat for 7 days w po abx once discharged. Continue packing wound daily w iodoform gauze. Will follow tomorrow.       Misael Ramirez MD  7/2/2022

## 2022-07-02 NOTE — NURSING NOTE
Scrotal incision irrigated with 10cc of 1/2 normal saline, 1/2 hydrogen peroxide then packed with Iodoform per MD instructions, pt tolerated fair.

## 2022-07-02 NOTE — PROGRESS NOTES
"[facility] Clinical Pharmacy Services: Vancomycin Pharmacokinetic Initial Consult Note    Dino Dai is a 46 y.o. male who is on day 1 of pharmacy to dose vancomycin.    Indication: SSTI  Consulting Provider: KATHY AGUERO  Planned Duration of Therapy: 5 days  Loading Dose Ordered or Given: 2500 mg on 7/1 at 2257  MRSA PCR performed: N/A  Culture/Source: BCx2 in process WCx in process  Target: -600 mg/L.hr   Other Antimicrobials: Zosyn    Vitals/Labs  Ht: 185.4 cm (73\"); Wt: 122 kg (270 lb)  Temp Readings from Last 1 Encounters:   07/02/22 99 °F (37.2 °C) (Oral)    Estimated Creatinine Clearance: 93.5 mL/min (A) (by C-G formula based on SCr of 1.35 mg/dL (H)).       Results from last 7 days   Lab Units 07/01/22  2124   CREATININE mg/dL 1.35*   WBC 10*3/mm3 6.34     Assessment/Plan:    Vancomycin Dose:   1000 mg IV every  12  hours  Predictive AUC level for the dose ordered is 450 mg/L.hr, which is within the target of 400-600 mg/L.hr  Vanc Trough has been ordered for 7/3 at 0930     Pharmacy will follow patient's kidney function and will adjust doses and obtain levels as necessary. Thank you for involving pharmacy in this patient's care. Please contact pharmacy with any questions or concerns.                           Raul Choudhury, [facility], located within [facility]  Clinical Pharmacist   "

## 2022-07-02 NOTE — PROGRESS NOTES
"[facility] Clinical Pharmacy Services: Vancomycin Monitoring Note  Dino Dai is a 46 y.o. male who is on full day 1/5 of pharmacy to dose vancomycin for SSTI.  -ED notation includes: scrotal abscess  -Nephrology and Urology following    Previous Vancomycin Dose:  2500mg IVPB loading dose 7/1 at 2257 (~20mg/kg).  Prior plan for 1000 mg IV every 12h (~8mg/kg)  Updated Cultures and Sensitivities:   7/1 BC x2 pending  7/1 Scrotum wound culture: 4+ Streptococcus agalactiae (Group B)       Vitals/Labs  Ht: 185.4 cm (73\"); Wt: 122 kg (270 lb)   Temp Readings from Last 1 Encounters:   07/02/22 98.5 °F (36.9 °C) (Oral)     Estimated Creatinine Clearance: 146.8 mL/min (by C-G formula based on SCr of 0.86 mg/dL).     Results from last 7 days   Lab Units 07/02/22  0552 07/01/22  2124   CREATININE mg/dL 0.86 1.35*   WBC 10*3/mm3 5.84 6.34      Latest Reference Range & Units 06/18/18 15:38 06/21/18 20:23 06/22/18 04:13 05/12/20 14:42 12/29/21 09:12 07/01/22 21:24 07/02/22 05:52   Creatinine 0.76 - 1.27 mg/dL 0.9 (E) 0.9 (E) 0.8 (E) 0.9 (E) 1.19 (H) (E) 1.35 (H) 0.86   (H): Data is abnormally high  (E): External lab result        Lab 07/01/22  2343 07/01/22  2124   LACTATE 1.8 2.6*     Assessment/Plan  SCr improved to 0.86mg/dL    Current Vancomycin Dose: 1000 mg IVPB q12h provides an [employer] predicted  mg/L.hr  which is less than target range 400-600.  Plan to increase to 1250mg IV q12h (~10mg/kg) for an [employer] predicted AUC of 462  Next Level Date and Time: Vanc Trough on 7/2 at PM dose.  Will ask 2nd/3rd shift RPh to review when available  Pharmacy will continue to monitor patient changes and renal function  Recommend daily SCr while on IV Vancomycin and Zosyn.      Thank you for involving pharmacy in this patient's care. Please contact pharmacy with any questions or concerns.       Maxwell Jade, PharmD  Clinical Pharmacist   "

## 2022-07-02 NOTE — ED PROVIDER NOTES
EMERGENCY DEPARTMENT ENCOUNTER    Room Number:  06/06  Date of encounter:  7/2/2022  PCP: Antonio Murray MD  Historian: Patient, spouse      I used full protective equipment while examining this patient.  This includes face mask, gloves and protective eyewear.  I washed my hands before entering the room and immediately upon leaving the room      HPI:  Chief Complaint: Scrotal abscess  A complete HPI/ROS/PMH/PSH/SH/FH are unobtainable due to: Nothing    Context: Dino Dai is a 46 y.o. male who presents to the ED c/o approximate 1 week history of gradual onset, progressively worsening scrotal abscess.  Abscess is localized to the right scrotum.  Patient reports severe pain, throbbing, constant pain.  Pain is worse with movement and sitting.  He reports a subjective fever last night.  Patient is a type II diabetic.  He denies any vomiting or dysuria.    Review of Medical Records  Reviewed internal medicine office visit from 5/16/2022.  Patient seen for small abscess on his leg    PAST MEDICAL HISTORY  Active Ambulatory Problems     Diagnosis Date Noted   • No Active Ambulatory Problems     Resolved Ambulatory Problems     Diagnosis Date Noted   • No Resolved Ambulatory Problems     Past Medical History:   Diagnosis Date   • Diabetes mellitus (HCC)    • Hyperlipidemia          PAST SURGICAL HISTORY  Past Surgical History:   Procedure Laterality Date   • BACK SURGERY           FAMILY HISTORY  History reviewed. No pertinent family history.      SOCIAL HISTORY  Social History     Socioeconomic History   • Marital status:    Tobacco Use   • Smoking status: Current Every Day Smoker     Packs/day: 1.00   Substance and Sexual Activity   • Alcohol use: No   • Drug use: No         ALLERGIES  Codeine        REVIEW OF SYSTEMS  All systems reviewed and negative except for those discussed in HPI.       PHYSICAL EXAM    I have reviewed the triage vital signs and nursing notes.    ED Triage Vitals   Temp Heart  Rate Resp BP SpO2   07/01/22 2032 07/01/22 2032 07/01/22 2032 07/01/22 2112 07/01/22 2032   97.9 °F (36.6 °C) 117 16 141/85 98 %      Temp src Heart Rate Source Patient Position BP Location FiO2 (%)   07/01/22 2032 07/01/22 2032 07/01/22 2112 07/01/22 2112 --   Tympanic Monitor Lying Right arm        Physical Exam  GENERAL: Alert, oriented, not distressed  HENT: head atraumatic, no nuchal rigidity  EYES: no scleral icterus, EOMI  CV: regular rhythm, regular rate, no murmur  RESPIRATORY: normal effort, CTA  ABDOMEN: soft, nontender  : Large abscess to right scrotum.  Active purulent discharge.  No significant erythema to the scrotum or perineum.  Penis is swollen.  MUSCULOSKELETAL: no deformity, FROM, no calf swelling or tenderness  NEURO: alert, moves all extremities, follows commands  SKIN: warm, dry        LAB RESULTS  Recent Results (from the past 24 hour(s))   Lactic Acid, Plasma    Collection Time: 07/01/22  9:24 PM    Specimen: Blood   Result Value Ref Range    Lactate 2.6 (C) 0.5 - 2.0 mmol/L   Comprehensive Metabolic Panel    Collection Time: 07/01/22  9:24 PM    Specimen: Blood   Result Value Ref Range    Glucose 487 (C) 65 - 99 mg/dL    BUN 10 6 - 20 mg/dL    Creatinine 1.35 (H) 0.76 - 1.27 mg/dL    Sodium 131 (L) 136 - 145 mmol/L    Potassium 4.1 3.5 - 5.2 mmol/L    Chloride 96 (L) 98 - 107 mmol/L    CO2 22.0 22.0 - 29.0 mmol/L    Calcium 8.9 8.6 - 10.5 mg/dL    Total Protein 6.3 6.0 - 8.5 g/dL    Albumin 3.90 3.50 - 5.20 g/dL    ALT (SGPT) 13 1 - 41 U/L    AST (SGOT) 13 1 - 40 U/L    Alkaline Phosphatase 167 (H) 39 - 117 U/L    Total Bilirubin 0.5 0.0 - 1.2 mg/dL    Globulin 2.4 gm/dL    A/G Ratio 1.6 g/dL    BUN/Creatinine Ratio 7.4 7.0 - 25.0    Anion Gap 13.0 5.0 - 15.0 mmol/L    eGFR 65.6 >60.0 mL/min/1.73   CBC Auto Differential    Collection Time: 07/01/22  9:24 PM    Specimen: Blood   Result Value Ref Range    WBC 6.34 3.40 - 10.80 10*3/mm3    RBC 5.27 4.14 - 5.80 10*6/mm3    Hemoglobin 15.7  13.0 - 17.7 g/dL    Hematocrit 45.4 37.5 - 51.0 %    MCV 86.1 79.0 - 97.0 fL    MCH 29.8 26.6 - 33.0 pg    MCHC 34.6 31.5 - 35.7 g/dL    RDW 13.2 12.3 - 15.4 %    RDW-SD 40.3 37.0 - 54.0 fl    MPV 10.0 6.0 - 12.0 fL    Platelets 203 140 - 450 10*3/mm3    Neutrophil % 61.5 42.7 - 76.0 %    Lymphocyte % 27.4 19.6 - 45.3 %    Monocyte % 8.8 5.0 - 12.0 %    Eosinophil % 1.3 0.3 - 6.2 %    Basophil % 0.5 0.0 - 1.5 %    Immature Grans % 0.5 0.0 - 0.5 %    Neutrophils, Absolute 3.90 1.70 - 7.00 10*3/mm3    Lymphocytes, Absolute 1.74 0.70 - 3.10 10*3/mm3    Monocytes, Absolute 0.56 0.10 - 0.90 10*3/mm3    Eosinophils, Absolute 0.08 0.00 - 0.40 10*3/mm3    Basophils, Absolute 0.03 0.00 - 0.20 10*3/mm3    Immature Grans, Absolute 0.03 0.00 - 0.05 10*3/mm3    nRBC 0.2 0.0 - 0.2 /100 WBC   COVID-19, [facility] IN-HOUSE CEPHEID/TREVOR NP SWAB IN TRANSPORT MEDIA 8-12 HR TAT - Swab, Nasopharynx    Collection Time: 07/01/22 10:05 PM    Specimen: Nasopharynx; Swab   Result Value Ref Range    COVID19 Not Detected Not Detected - Ref. Range   STAT Lactic Acid, Reflex    Collection Time: 07/01/22 11:43 PM    Specimen: Blood   Result Value Ref Range    Lactate 1.8 0.5 - 2.0 mmol/L   POC Glucose Once    Collection Time: 07/01/22 11:52 PM    Specimen: Blood   Result Value Ref Range    Glucose 296 (H) 70 - 130 mg/dL       Ordered the above labs and independently reviewed the results.        RADIOLOGY  CT Abdomen Pelvis With Contrast    Result Date: 7/2/2022  Patient: ESME DAS  Time Out: 00:12 Exam(s): CT ABDOMEN + PELVIS With Contrast IV Amt: 85ml isovu 300 EXAM:   CT Abdomen and Pelvis With Intravenous Contrast CLINICAL HISTORY:    Reason for exam: Right scrotal abscess. Please ensure that CT gets down to the level of the scrotum.. TECHNIQUE:   Axial computed tomography images of the abdomen and pelvis with intravenous contrast.  CTDI is 23.5 mGy and DLP is 1737.8 mGy-cm.  This CT exam was performed according to the principle of YOVANI (As Low  As Reasonably Achievable) by using one or more of the following dose reduction techniques: automated exposure control, adjustment of the mA and or kV according to patient size, and or use of iterative reconstruction technique. COMPARISON:   No relevant prior studies available. FINDINGS:   Lung bases:  Unremarkable.  No mass.  No consolidation.  ABDOMEN:   Liver:  Hepatosplenomegaly and diffuse hepatic steatosis.   Gallbladder and bile ducts:  Unremarkable.  No calcified stones.  No ductal dilation.   Pancreas:  Unremarkable.  No mass.  No ductal dilation.   Spleen:  See above.   Adrenals:  Unremarkable.  No mass.   Kidneys and ureters:  Unremarkable.  No solid mass.  No hydronephrosis.   Stomach and bowel:  Unremarkable.  No obstruction.  No mucosal thickening.  PELVIS:   Appendix:  No findings to suggest acute appendicitis.   Bladder:  Unremarkable.  No mass.   Reproductive:  Unremarkable as visualized.  ABDOMEN and PELVIS:   Intraperitoneal space:  Unremarkable.  No free air.  No significant fluid collection.   Bones joints:  No acute fracture.  No dislocation.   Soft tissues:  Circumscribed round left cortical renal cyst measuring up to 36 mm.  Stranding of the subcutaneous fat beginning at the level of the base of the penis and extending posteriorly on the right to involve the scrotum.  No loculated fluid collection.  Negative for soft tissue gas.  Finding suggest inflammatory infectious process such as cellulitis.   Vasculature:  Unremarkable.  No abdominal aortic aneurysm.   Lymph nodes:  Unremarkable.  No enlarged lymph nodes. IMPRESSION:       Inflammatory process anterior right perineum extending into the right scrotum.  Nonloculated fluid and stranding but no evidence of abscess or soft tissue gas.     Electronically signed by Doris Mitchell DOomate [employer] on 07-02-22 at 0012      I ordered the above noted radiological studies. Reviewed by me and discussed with radiologist.   See dictation for official radiology interpretation.    Incision & Drainage    Date/Time: 7/1/2022 11:32 PM  Performed by: Dashawn Flor PA  Authorized by: Alvin Orozco II, MD     Consent:     Consent obtained:  Verbal    Consent given by:  Patient  Location:     Type:  Abscess    Location:  Anogenital    Anogenital location:  Scrotal wall  Pre-procedure details:     Skin preparation:  Chlorhexidine  Anesthesia:     Anesthesia method:  Local infiltration    Local anesthetic:  Lidocaine 1% WITH epi  Procedure type:     Complexity:  Complex  Procedure details:     Incision types:  Single straight    Incision depth:  Subcutaneous    Wound management:  Probed and deloculated and irrigated with saline    Drainage:  Purulent    Drainage amount:  Copious    Packing materials:  1/4 in gauze  Post-procedure details:     Procedure completion:  Tolerated well, no immediate complications            MEDICATIONS GIVEN IN ER    Medications   vancomycin 2500 mg/500 mL 0.9% NS IVPB ([facility]) (2,500 mg Intravenous New Bag 7/1/22 2257)   piperacillin-tazobactam (ZOSYN) 3.375 g in iso-osmotic dextrose 50 ml (premix) (0 g Intravenous Stopped 7/1/22 2257)   lidocaine 1% - EPINEPHrine 1:429395 (XYLOCAINE W/EPI) 1 %-1:168762 injection 10 mL (10 mL Injection Given by Other 7/1/22 2257)   HYDROmorphone (DILAUDID) injection 0.5 mg (0.5 mg Intravenous Given 7/1/22 2201)   ondansetron (ZOFRAN) injection 4 mg (4 mg Intravenous Given 7/1/22 2159)   sodium chloride 0.9 % bolus 1,000 mL (1,000 mL Intravenous New Bag 7/1/22 2303)   insulin regular (humuLIN R,novoLIN R) injection 10 Units (10 Units Intravenous Given 7/1/22 2317)   iopamidol (ISOVUE-300) 61 % injection 100 mL (85 mL Intravenous Given 7/1/22 2237)   HYDROmorphone (DILAUDID) injection 0.5 mg (0.5 mg Intravenous Given 7/1/22 2340)         PROGRESS, DATA ANALYSIS, CONSULTS, AND MEDICAL DECISION MAKING    All labs have been independently reviewed by me.  All radiology studies have  been reviewed by me and discussed with radiologist dictating the report.   EKG's independently viewed and interpreted by me.  Discussion below represents my analysis of pertinent findings related to patient's condition, differential diagnosis, treatment plan and final disposition.    I have discussed case with Dr. Orozco, emergency room physician.  He has performed his own bedside examination and agrees with treatment plan.    ED Course as of 07/02/22 0041 Fri Jul 01, 2022 2121 Patient presents with 1 week history of worsening abscess to right scrotum.  It is now open and draining per patient.  He reports a subjective fever last night.  He is a diabetic.  High concern for potential development of Tere's gangrene.  Plan to check basic labs, CT scan pelvis, and give antibiotics. [EE]   2204 WBC: 6.34 [EE]   2204 Hemoglobin: 15.7 [EE]   2217 Lactate(!!): 2.6 [EE]   2217 Creatinine(!): 1.35 [EE]   2333 Patient states he has not had his diabetes medicines in greater than 1 month.  Given his noncompliance and hyperglycemia, we will admit for further evaluation. [EE]   Sat Jul 02, 2022   0006 Glucose(!): 296 [EE]   0040 I discussed case with Araceli Robledo, nurse practitioner with [facility].  She agrees admit the patient to Dr. Norman. [EE]      ED Course User Index  [EE] Dashawn Flor PA       AS OF 00:41 EDT VITALS:    BP - 129/79  HR - 96  TEMP - 97.9 °F (36.6 °C) (Tympanic)  O2 SATS - 92%        DIAGNOSIS  Final diagnoses:   Scrotal abscess   Type 2 diabetes mellitus with hyperglycemia, without long-term current use of insulin (HCC)   Lactic acid acidosis         DISPOSITION  Admitted      Dictated utilizing Dragon dictation     Dashawn Flor PA  07/02/22 0041

## 2022-07-02 NOTE — H&P
HISTORY AND PHYSICAL   [facility]        Patient Identification:  Name: Dino Dai  Age: 46 y.o.  Sex: male  :  1975  MRN: [id number]                     Primary Care Physician: Antonio Murray MD    Chief Complaint:  Scrotal abscess    History of Present Illness:         The patient is a 46 y.o. male who presents to the hospital complaining of approximate 1 week history of gradual onset, progressively worsening scrotal abscess.  Abscess is localized to the right scrotum.  Patient reports severe pain, throbbing, constant pain.  Pain is worse with movement and sitting.  He reports a subjective fever last night.  Patient is a type II diabetic.  He denies any vomiting or dysuria.  The patient was evaluated in the ER and had I&D of scrotal abscess.  Also noted to have very elevated sugar of over 400.  He states that he has been out of his medicine for a few weeks and not taking anything.  He is a  and has been away from home for over a month.  The patient was admitted and started on broad-spectrum IV antibiotics and urology was consulted from the ER for further evaluation treatment.      Past Medical History:  Past Medical History:   Diagnosis Date   • Diabetes mellitus (HCC)    • Hyperlipidemia      Past Surgical History:  Past Surgical History:   Procedure Laterality Date   • BACK SURGERY        Home Meds:  Medications Prior to Admission   Medication Sig Dispense Refill Last Dose   • LISINOPRIL PO Take 5 mg by mouth Daily.   2022 at Unknown time   • metFORMIN (GLUCOPHAGE) 500 MG tablet Take 1 tablet by mouth 2 (Two) Times a Day With Meals. (Patient taking differently: Take 1,000 mg by mouth 2 (Two) Times a Day With Meals.) 20 tablet 0 2022 at Unknown time   • omeprazole (priLOSEC) 20 MG capsule Take 20 mg by mouth Daily.   2022 at Unknown time   • atorvastatin (LIPITOR) 20 MG tablet Take 40 mg by mouth Daily.      • Ertugliflozin L-PyroglutamicAc (Steglatro)  15 MG tablet Take 15 mg by mouth Every Morning.      • ondansetron (ZOFRAN) 4 MG tablet Take 1 tablet by mouth Every 6 (Six) Hours. (Patient taking differently: Take 4 mg by mouth 4 (Four) Times a Day As Needed.) 10 tablet 0 More than a month at Unknown time     Current meds    Current Facility-Administered Medications:   •  acetaminophen (TYLENOL) tablet 650 mg, 650 mg, Oral, Q4H PRN, 650 mg at 07/02/22 0846 **OR** acetaminophen (TYLENOL) 160 MG/5ML solution 650 mg, 650 mg, Oral, Q4H PRN **OR** acetaminophen (TYLENOL) suppository 650 mg, 650 mg, Rectal, Q4H PRN, Araceli Damon APRN  •  calcium carbonate (TUMS) chewable tablet 500 mg (200 mg elemental), 2 tablet, Oral, BID PRN, Araceli Damon APRN  •  dextrose (D50W) (25 g/50 mL) IV injection 25 g, 25 g, Intravenous, Q15 Min PRN, Araceli Damon APRN  •  dextrose (GLUTOSE) oral gel 15 g, 15 g, Oral, Q15 Min PRN, Araceli Damon APRN  •  glucagon (human recombinant) (GLUCAGEN DIAGNOSTIC) injection 1 mg, 1 mg, Subcutaneous, PRN, Araceli Damon APRN  •  insulin lispro (ADMELOG) injection 0-14 Units, 0-14 Units, Subcutaneous, TID AC, Araceli Damon APRN, 10 Units at 07/02/22 0911  •  morphine injection 2 mg, 2 mg, Intravenous, Q3H PRN, Araceli Damon APRN, 2 mg at 07/02/22 0846  •  ondansetron (ZOFRAN) tablet 4 mg, 4 mg, Oral, Q6H PRN **OR** ondansetron (ZOFRAN) injection 4 mg, 4 mg, Intravenous, Q6H PRN, Araceli Damon APRN  •  Pharmacy to dose vancomycin, , Does not apply, Continuous PRN, Araceli Damon APRN  •  Pharmacy to Dose Zosyn, , Does not apply, Continuous PRN, Araceli Damon APRN  •  piperacillin-tazobactam (ZOSYN) 3.375 g in iso-osmotic dextrose 50 ml (premix), 3.375 g, Intravenous, Q8H, Araceli Damon APRN, 3.375 g at 07/02/22 0911  •  sodium chloride 0.9 % flush 10 mL, 10 mL, Intravenous, Q12H, Araceli Damon APRN, 10 mL at 07/02/22 0847  •  sodium chloride 0.9 % flush 10 mL,  10 mL, Intravenous, PRN, Araceli Damon APRN  •  sodium chloride 0.9 % infusion, 100 mL/hr, Intravenous, Continuous, Araceli Damon APRN, Last Rate: 100 mL/hr at 22 0249, 100 mL/hr at 22 024  •  vancomycin 1250 mg/250 mL 0.9% NS IVPB ([facility]), 1,250 mg, Intravenous, Q12H, Araceli Damon APRN  Allergies:  Allergies   Allergen Reactions   • Codeine      Immunizations:    There is no immunization history on file for this patient.  Social History:   Social History     Social History Narrative   • Not on file     Social History     Socioeconomic History   • Marital status:    Tobacco Use   • Smoking status: Current Every Day Smoker     Packs/day: 1.00   Substance and Sexual Activity   • Alcohol use: No   • Drug use: No       Family History:  History reviewed. No pertinent family history.     Review of Systems  See history of present illness and past medical history.  Patient denies headache, dizziness, syncope, falls, trauma, change in vision, change in hearing, change in taste, changes in weight, changes in appetite, focal weakness, numbness, or paresthesia.  Patient denies chest pain, palpitations, dyspnea, orthopnea, PND, cough, sinus pressure, rhinorrhea, epistaxis, hemoptysis, nausea, vomiting, hematemesis, diarrhea, constipation or hematchezia.  Denies cold or heat intolerance, polydipsia, polyuria, polyphagia. Denies hematuria, pyuria, dysuria, hesitancy, frequency or urgency.   Denies fever, chills, sweats, night sweats.  Denies missing any routine medications. Remainder of ROS is negative.    Objective:  tMax 24 hrs: Temp (24hrs), Av.5 °F (36.9 °C), Min:97.9 °F (36.6 °C), Max:99 °F (37.2 °C)    Vitals Ranges:   Temp:  [97.9 °F (36.6 °C)-99 °F (37.2 °C)] 98.4 °F (36.9 °C)  Heart Rate:  [] 82  Resp:  [16-18] 18  BP: (104-147)/(67-89) 115/67      Exam:  /67 (BP Location: Left arm, Patient Position: Lying)   Pulse 82   Temp 98.4 °F (36.9 °C) (Oral)   Resp 18   " Ht 185.4 cm (73\")   Wt 122 kg (270 lb)   SpO2 91%   BMI 35.62 kg/m²     General Appearance:    Alert, cooperative, no distress, appears stated age   Head:    Normocephalic, without obvious abnormality, atraumatic   Eyes:    PERRL, conjunctivae/corneas clear, EOM's intact, both eyes   Ears:    Normal external ear canals, both ears   Nose:   Nares normal, septum midline, mucosa normal, no drainage    or sinus tenderness   Throat:   Lips, mucosa, and tongue normal   Neck:   Supple, symmetrical, trachea midline, no adenopathy;     thyroid:  no enlargement/tenderness/nodules; no carotid    bruit or JVD   Back:     Symmetric, no curvature, ROM normal, no CVA tenderness   Lungs:     Clear to auscultation bilaterally, respirations unlabored   Chest Wall:    No tenderness or deformity    Heart:    Regular rate and rhythm, S1 and S2 normal, no murmur, rub   or gallop   Abdomen:     Soft, nontender, bowel sounds active all four quadrants,     no masses, no hepatomegaly, no splenomegaly, patient has right-sided erythema of scrotum and swelling of the penis with drain in place right scrotum   Extremities:   Extremities normal, atraumatic, no cyanosis or edema   Pulses:   2+ and symmetric all extremities   Skin:   Skin color, texture, turgor normal, no rashes or lesions   Lymph nodes:   Cervical, supraclavicular, and axillary nodes normal   Neurologic:   CNII-XII intact, normal strength, sensation intact throughout      .    Data Review:  Lab Results (last 72 hours)     Procedure Component Value Units Date/Time    POC Glucose Once [[id number]]  (Abnormal) Collected: 07/02/22 1152    Specimen: Blood Updated: 07/02/22 1153     Glucose 352 mg/dL      Comment: Meter: [id number] : [id number] Elisa RUBALCAVA       POC Glucose Once [[id number]]  (Abnormal) Collected: 07/02/22 0852    Specimen: Blood Updated: 07/02/22 0853     Glucose 349 mg/dL      Comment: Meter: [id number] : [id number] Gina Wright RN       Wound Culture " - Wound, Scrotum [[id number]]  (Abnormal) Collected: 07/01/22 2203    Specimen: Wound from Scrotum Updated: 07/02/22 0707     Wound Culture Heavy growth (4+) Streptococcus agalactiae (Group B)     Comment: This organism is considered to be universally susceptible to penicillin.  No further antibiotic testing will be performed. If Clindamycin or Erythromycin is the drug of choice, notify the laboratory within 7 days to request susceptibility testing.        Gram Stain Many (4+) WBCs per low power field      Moderate (3+) Gram positive cocci    Basic Metabolic Panel [[id number]]  (Abnormal) Collected: 07/02/22 0552    Specimen: Blood Updated: 07/02/22 0706     Glucose 270 mg/dL      BUN 10 mg/dL      Creatinine 0.86 mg/dL      Sodium 136 mmol/L      Potassium 4.2 mmol/L      Chloride 102 mmol/L      CO2 25.0 mmol/L      Calcium 8.5 mg/dL      BUN/Creatinine Ratio 11.6     Anion Gap 9.0 mmol/L      eGFR 108.1 mL/min/1.73      Comment: National Kidney Foundation and American Society of Nephrology (ASN) Task Force recommended calculation based on the Chronic Kidney Disease Epidemiology Collaboration (CKD-EPI) equation refit without adjustment for race.       Narrative:      GFR Normal >60  Chronic Kidney Disease <60  Kidney Failure <15      Hemoglobin A1c [[id number]]  (Abnormal) Collected: 07/02/22 0552    Specimen: Blood Updated: 07/02/22 0628     Hemoglobin A1C 12.50 %     Narrative:      Hemoglobin A1C Ranges:    Increased Risk for Diabetes  5.7% to 6.4%  Diabetes                     >= 6.5%  Diabetic Goal                < 7.0%    CBC (No Diff) [[id number]]  (Normal) Collected: 07/02/22 0552    Specimen: Blood Updated: 07/02/22 0620     WBC 5.84 10*3/mm3      RBC 4.95 10*6/mm3      Hemoglobin 14.6 g/dL      Hematocrit 42.7 %      MCV 86.3 fL      MCH 29.5 pg      MCHC 34.2 g/dL      RDW 13.0 %      RDW-SD 40.2 fl      MPV 10.2 fL      Platelets 190 10*3/mm3     STAT Lactic Acid, Reflex [[id number]]  (Normal) Collected:  07/01/22 2343    Specimen: Blood Updated: 07/02/22 0005     Lactate 1.8 mmol/L     POC Glucose Once [[id number]]  (Abnormal) Collected: 07/01/22 2352    Specimen: Blood Updated: 07/01/22 2353     Glucose 296 mg/dL      Comment: Meter: [id number] : [id number] [facility]       COVID PRE-OP / PRE-PROCEDURE SCREENING ORDER (NO ISOLATION) - Swab, Nasopharynx [[id number]]  (Normal) Collected: 07/01/22 2205    Specimen: Swab from Nasopharynx Updated: 07/01/22 2247    Narrative:      The following orders were created for panel order COVID PRE-OP / PRE-PROCEDURE SCREENING ORDER (NO ISOLATION) - Swab, Nasopharynx.  Procedure                               Abnormality         Status                     ---------                               -----------         ------                     COVID-19,[facility]HOU[facility]...[[id number]]  Normal              Final result                 Please view results for these tests on the individual orders.    COVID-19,[facility] CEPHEID/TREVOR NP SWAB IN TRANSPORT MEDIA 8-12 HR TAT - Swab, Nasopharynx [[id number]]  (Normal) Collected: 07/01/22 2205    Specimen: Swab from Nasopharynx Updated: 07/01/22 2247     COVID19 Not Detected    Narrative:      Fact sheet for providers: https://www.fda.gov/media/384286/download    Fact sheet for patients: https://www.fda.gov/media/666314/download    Test performed by PCR.    Comprehensive Metabolic Panel [[id number]]  (Abnormal) Collected: 07/01/22 2124    Specimen: Blood Updated: 07/01/22 2217     Glucose 487 mg/dL      BUN 10 mg/dL      Creatinine 1.35 mg/dL      Sodium 131 mmol/L      Potassium 4.1 mmol/L      Comment: Slight hemolysis detected by analyzer. Results may be affected.        Chloride 96 mmol/L      CO2 22.0 mmol/L      Calcium 8.9 mg/dL      Total Protein 6.3 g/dL      Albumin 3.90 g/dL      ALT (SGPT) 13 U/L      AST (SGOT) 13 U/L      Comment: Slight hemolysis detected by analyzer. Results may be affected.        Alkaline  Phosphatase 167 U/L      Total Bilirubin 0.5 mg/dL      Globulin 2.4 gm/dL      A/G Ratio 1.6 g/dL      BUN/Creatinine Ratio 7.4     Anion Gap 13.0 mmol/L      eGFR 65.6 mL/min/1.73      Comment: National Kidney Foundation and American Society of Nephrology (ASN) Task Force recommended calculation based on the Chronic Kidney Disease Epidemiology Collaboration (CKD-EPI) equation refit without adjustment for race.       Narrative:      GFR Normal >60  Chronic Kidney Disease <60  Kidney Failure <15      Lactic Acid, Plasma [[id number]]  (Abnormal) Collected: 07/01/22 2124    Specimen: Blood Updated: 07/01/22 2214     Lactate 2.6 mmol/L     CBC & Differential [[id number]]  (Normal) Collected: 07/01/22 2124    Specimen: Blood Updated: 07/01/22 2141    Narrative:      The following orders were created for panel order CBC & Differential.  Procedure                               Abnormality         Status                     ---------                               -----------         ------                     CBC Auto Differential[[id number]]        Normal              Final result                 Please view results for these tests on the individual orders.    CBC Auto Differential [[id number]]  (Normal) Collected: 07/01/22 2124    Specimen: Blood Updated: 07/01/22 2141     WBC 6.34 10*3/mm3      RBC 5.27 10*6/mm3      Hemoglobin 15.7 g/dL      Hematocrit 45.4 %      MCV 86.1 fL      MCH 29.8 pg      MCHC 34.6 g/dL      RDW 13.2 %      RDW-SD 40.3 fl      MPV 10.0 fL      Platelets 203 10*3/mm3      Neutrophil % 61.5 %      Lymphocyte % 27.4 %      Monocyte % 8.8 %      Eosinophil % 1.3 %      Basophil % 0.5 %      Immature Grans % 0.5 %      Neutrophils, Absolute 3.90 10*3/mm3      Lymphocytes, Absolute 1.74 10*3/mm3      Monocytes, Absolute 0.56 10*3/mm3      Eosinophils, Absolute 0.08 10*3/mm3      Basophils, Absolute 0.03 10*3/mm3      Immature Grans, Absolute 0.03 10*3/mm3      nRBC 0.2 /100 WBC     Blood Culture -  Blood, Arm, Right [[id number]] Collected: 07/01/22 2124    Specimen: Blood from Arm, Right Updated: 07/01/22 2137    Blood Culture - Blood, Arm, Right [[id number]] Collected: 07/01/22 2131    Specimen: Blood from Arm, Right Updated: 07/01/22 2136              Results from last 7 days   Lab Units 07/02/22  0552   HEMOGLOBIN A1C % 12.50*      Imaging Results (All)     Procedure Component Value Units Date/Time    CT Abdomen Pelvis With Contrast [[id number]] Collected: 07/02/22 0013     Updated: 07/02/22 0013    Narrative:        Patient: ESME DAS  Time Out: 00:12  Exam(s): CT ABDOMEN + PELVIS With Contrast IV Amt: 85ml isovu 300    EXAM:    CT Abdomen and Pelvis With Intravenous Contrast    CLINICAL HISTORY:     Reason for exam: Right scrotal abscess. Please ensure that CT gets   down to the level of the scrotum..    TECHNIQUE:    Axial computed tomography images of the abdomen and pelvis with   intravenous contrast.  CTDI is 23.5 mGy and DLP is 1737.8 mGy-cm.  This   CT exam was performed according to the principle of ALARA (As Low As   Reasonably Achievable) by using one or more of the following dose   reduction techniques: automated exposure control, adjustment of the mA   and or kV according to patient size, and or use of iterative   reconstruction technique.    COMPARISON:    No relevant prior studies available.    FINDINGS:    Lung bases:  Unremarkable.  No mass.  No consolidation.     ABDOMEN:    Liver:  Hepatosplenomegaly and diffuse hepatic steatosis.    Gallbladder and bile ducts:  Unremarkable.  No calcified stones.  No   ductal dilation.    Pancreas:  Unremarkable.  No mass.  No ductal dilation.    Spleen:  See above.    Adrenals:  Unremarkable.  No mass.    Kidneys and ureters:  Unremarkable.  No solid mass.  No hydronephrosis.    Stomach and bowel:  Unremarkable.  No obstruction.  No mucosal   thickening.     PELVIS:    Appendix:  No findings to suggest acute appendicitis.    Bladder:  Unremarkable.  No  mass.    Reproductive:  Unremarkable as visualized.     ABDOMEN and PELVIS:    Intraperitoneal space:  Unremarkable.  No free air.  No significant   fluid collection.    Bones joints:  No acute fracture.  No dislocation.    Soft tissues:  Circumscribed round left cortical renal cyst measuring   up to 36 mm.  Stranding of the subcutaneous fat beginning at the level of   the base of the penis and extending posteriorly on the right to involve   the scrotum.  No loculated fluid collection.  Negative for soft tissue   gas.  Finding suggest inflammatory infectious process such as cellulitis.    Vasculature:  Unremarkable.  No abdominal aortic aneurysm.    Lymph nodes:  Unremarkable.  No enlarged lymph nodes.    IMPRESSION:         Inflammatory process anterior right perineum extending into the right   scrotum.  Nonloculated fluid and stranding but no evidence of abscess or   soft tissue gas.      Impression:          Electronically signed by Caio Viera DO, Diplomate American Board of   Radiology on 07-02-22 at 0012        Past Medical History:   Diagnosis Date   • Diabetes mellitus (HCC)    • Hyperlipidemia        Assessment:  Active Hospital Problems    Diagnosis  POA   • **Scrotal abscess [N49.2]  Yes   • Lactic acid acidosis [E87.2]  Yes   • Type 2 diabetes mellitus with hyperglycemia, without long-term current use of insulin (HCC) [E11.65]  Yes   • Hyperlipidemia [E78.5]  Yes      Resolved Hospital Problems   No resolved problems to display.       Plan:  The patient admitted to hospital await urology consult.  We will continue with broad-spectrum IV antibiotics and also add some sliding scale insulin to his oral medication.  Need better glycemic control.  We will get follow-up lab studies and await cultures.    Tj Cerrato MD  7/2/2022  12:48 EDT

## 2022-07-03 VITALS
OXYGEN SATURATION: 95 % | HEIGHT: 73 IN | RESPIRATION RATE: 18 BRPM | WEIGHT: 270 LBS | TEMPERATURE: 98.1 F | SYSTOLIC BLOOD PRESSURE: 125 MMHG | HEART RATE: 87 BPM | DIASTOLIC BLOOD PRESSURE: 71 MMHG | BODY MASS INDEX: 35.78 KG/M2

## 2022-07-03 LAB
BACTERIA SPEC AEROBE CULT: ABNORMAL
BACTERIA SPEC AEROBE CULT: ABNORMAL
CREAT SERPL-MCNC: 0.96 MG/DL (ref 0.76–1.27)
EGFRCR SERPLBLD CKD-EPI 2021: 98.7 ML/MIN/1.73
GLUCOSE BLDC GLUCOMTR-MCNC: 222 MG/DL (ref 70–130)
GLUCOSE BLDC GLUCOMTR-MCNC: 231 MG/DL (ref 70–130)
GRAM STN SPEC: ABNORMAL
GRAM STN SPEC: ABNORMAL

## 2022-07-03 PROCEDURE — 82962 GLUCOSE BLOOD TEST: CPT

## 2022-07-03 PROCEDURE — 63710000001 INSULIN LISPRO (HUMAN) PER 5 UNITS: Performed by: NURSE PRACTITIONER

## 2022-07-03 PROCEDURE — 25010000002 PIPERACILLIN SOD-TAZOBACTAM PER 1 G: Performed by: NURSE PRACTITIONER

## 2022-07-03 PROCEDURE — 82565 ASSAY OF CREATININE: CPT | Performed by: NURSE PRACTITIONER

## 2022-07-03 RX ORDER — NYSTATIN 100000 U/G
1 CREAM TOPICAL EVERY 12 HOURS SCHEDULED
Qty: 13 G | Refills: 0 | Status: SHIPPED | OUTPATIENT
Start: 2022-07-03 | End: 2022-07-10

## 2022-07-03 RX ORDER — AMOXICILLIN AND CLAVULANATE POTASSIUM 875; 125 MG/1; MG/1
1 TABLET, FILM COATED ORAL 2 TIMES DAILY
Qty: 14 TABLET | Refills: 0 | Status: SHIPPED | OUTPATIENT
Start: 2022-07-03 | End: 2022-07-10

## 2022-07-03 RX ORDER — NYSTATIN 100000 U/G
1 CREAM TOPICAL EVERY 12 HOURS SCHEDULED
Status: DISCONTINUED | OUTPATIENT
Start: 2022-07-03 | End: 2022-07-03 | Stop reason: HOSPADM

## 2022-07-03 RX ORDER — HYDROCODONE BITARTRATE AND ACETAMINOPHEN 5; 325 MG/1; MG/1
1 TABLET ORAL EVERY 4 HOURS PRN
Qty: 10 TABLET | Refills: 0 | Status: SHIPPED | OUTPATIENT
Start: 2022-07-03 | End: 2022-07-09

## 2022-07-03 RX ADMIN — HYDROCODONE BITARTRATE AND ACETAMINOPHEN 1 TABLET: 5; 325 TABLET ORAL at 12:13

## 2022-07-03 RX ADMIN — TAZOBACTAM SODIUM AND PIPERACILLIN SODIUM 3.38 G: 375; 3 INJECTION, SOLUTION INTRAVENOUS at 05:59

## 2022-07-03 RX ADMIN — HYDROCODONE BITARTRATE AND ACETAMINOPHEN 1 TABLET: 5; 325 TABLET ORAL at 05:59

## 2022-07-03 RX ADMIN — PANTOPRAZOLE SODIUM 40 MG: 40 TABLET, DELAYED RELEASE ORAL at 06:00

## 2022-07-03 RX ADMIN — NYSTATIN 1 APPLICATION: 100000 CREAM TOPICAL at 12:13

## 2022-07-03 RX ADMIN — TAZOBACTAM SODIUM AND PIPERACILLIN SODIUM 4.5 G: 500; 4 INJECTION, SOLUTION INTRAVENOUS at 12:14

## 2022-07-03 RX ADMIN — INSULIN LISPRO 5 UNITS: 100 INJECTION, SOLUTION INTRAVENOUS; SUBCUTANEOUS at 12:13

## 2022-07-03 RX ADMIN — EMPAGLIFLOZIN 25 MG: 25 TABLET, FILM COATED ORAL at 08:56

## 2022-07-03 RX ADMIN — ATORVASTATIN CALCIUM 40 MG: 20 TABLET, FILM COATED ORAL at 08:54

## 2022-07-03 RX ADMIN — INSULIN LISPRO 5 UNITS: 100 INJECTION, SOLUTION INTRAVENOUS; SUBCUTANEOUS at 08:56

## 2022-07-03 RX ADMIN — Medication 10 ML: at 08:55

## 2022-07-03 RX ADMIN — LISINOPRIL 5 MG: 5 TABLET ORAL at 08:54

## 2022-07-03 NOTE — PROGRESS NOTES
CC: Can I go home yet?    Patient resting in bed  NAD  States pain is managed  Urine is clear, no dysuria  Complains of itching on penis    Right scrotum with small incision, no drainage, awaiting packing  Phallus with slight edema a circ line, peeling near meatus    AVSS    Cr 0.96  WBC on 7/2 - 5.84    Wound culture - +Strep agalactiae    Plan:  Start nystatin cream to phallus  Continue BID packing of wound  Continue IV abx  Ok to discharge home per urology  Recommend discharging him home on 7 day course of Augmentin 875mg po BID, Continue packing incision 2x day     Schedule outpatient follow up with Dr. Ramirez in 3 weeks for recheck with [facility], [phone]

## 2022-07-03 NOTE — DISCHARGE SUMMARY
PHYSICIAN DISCHARGE SUMMARY                                                                        [facility]    Patient Identification:  Name: Dino Dai  Age: 46 y.o.  Sex: male  :  1975  MRN: [id number]  Primary Care Physician: Antonio Murray MD    Admit date: 2022  Discharge date and time:7/3/2022  Discharged Condition: good    Discharge Diagnoses:  Active Hospital Problems    Diagnosis  POA    **Scrotal abscess [N49.2]  Yes    Lactic acid acidosis [E87.2]  Yes    Type 2 diabetes mellitus with hyperglycemia, without long-term current use of insulin (HCC) [E11.65]  Yes    Hyperlipidemia [E78.5]  Yes      Resolved Hospital Problems   No resolved problems to display.   Cellulitis related to diabetes      PMHX:   Past Medical History:   Diagnosis Date    Diabetes mellitus (HCC)     Hyperlipidemia      PSHX:   Past Surgical History:   Procedure Laterality Date    BACK SURGERY         Hospital Course: Dino Dai   is a 46 y.o. male who presents to the hospital complaining of approximate 1 week history of gradual onset, progressively worsening scrotal abscess.  Abscess is localized to the right scrotum.  Patient reports severe pain, throbbing, constant pain.  Pain is worse with movement and sitting.  He reports a subjective fever last night.  Patient is a type II diabetic.  He denies any vomiting or dysuria.  The patient was evaluated in the ER and had I&D of scrotal abscess.  Also noted to have very elevated sugar of over 400.  He states that he has been out of his medicine for a few weeks and not taking anything.  He is a  and has been away from home for over a month.  The patient was admitted and started on broad-spectrum IV antibiotics and urology was consulted from the ER for further evaluation treatment.         The patient was admitted to hospital and treated with IV antibiotics for scrotal  cellulitis with abscess which was drained in the ER.  Urology saw the patient and after day of antibiotics felt that he looked well enough to go home continue with oral antibiotics with Augmentin for 7 more days.  He will follow-up with his primary care in 1 week and was advised to get his sugars under better control.  He will also follow-up with urology in 3 weeks.    Consults:     Consults       Date and Time Order Name Status Description    7/2/2022  1:06 AM Inpatient Urology Consult Completed     7/1/2022 11:19 PM RORY (on-call MD unless specified) Details            Results from last 7 days   Lab Units 07/02/22  0552   WBC 10*3/mm3 5.84   HEMOGLOBIN g/dL 14.6   HEMATOCRIT % 42.7   PLATELETS 10*3/mm3 190     Results from last 7 days   Lab Units 07/03/22  0553 07/02/22  0552   SODIUM mmol/L  --  136   POTASSIUM mmol/L  --  4.2   CHLORIDE mmol/L  --  102   CO2 mmol/L  --  25.0   BUN mg/dL  --  10   CREATININE mg/dL 0.96 0.86   GLUCOSE mg/dL  --  270*   CALCIUM mg/dL  --  8.5*     Significant Diagnostic Studies:   WBC   Date Value Ref Range Status   07/02/2022 5.84 3.40 - 10.80 10*3/mm3 Final     Hemoglobin   Date Value Ref Range Status   07/02/2022 14.6 13.0 - 17.7 g/dL Final     Hematocrit   Date Value Ref Range Status   07/02/2022 42.7 37.5 - 51.0 % Final     Platelets   Date Value Ref Range Status   07/02/2022 190 140 - 450 10*3/mm3 Final     Sodium   Date Value Ref Range Status   07/02/2022 136 136 - 145 mmol/L Final     Potassium   Date Value Ref Range Status   07/02/2022 4.2 3.5 - 5.2 mmol/L Final     Chloride   Date Value Ref Range Status   07/02/2022 102 98 - 107 mmol/L Final     CO2   Date Value Ref Range Status   07/02/2022 25.0 22.0 - 29.0 mmol/L Final     BUN   Date Value Ref Range Status   07/02/2022 10 6 - 20 mg/dL Final     Creatinine   Date Value Ref Range Status   07/03/2022 0.96 0.76 - 1.27 mg/dL Final     Glucose   Date Value Ref Range Status   07/02/2022 270 (H) 65 - 99 mg/dL Final     Calcium    Date Value Ref Range Status   07/02/2022 8.5 (L) 8.6 - 10.5 mg/dL Final     AST (SGOT)   Date Value Ref Range Status   07/01/2022 13 1 - 40 U/L Final     Comment:     Slight hemolysis detected by analyzer. Results may be affected.     ALT (SGPT)   Date Value Ref Range Status   07/01/2022 13 1 - 41 U/L Final     Alkaline Phosphatase   Date Value Ref Range Status   07/01/2022 167 (H) 39 - 117 U/L Final     No results found for: APTT, INR  No results found for: COLORU, CLARITYU, SPECGRAV, PHUR, PROTEINUR, GLUCOSEU, KETONESU, BLOODU, NITRITE, LEUKOCYTESUR, BILIRUBINUR, UROBILINOGEN, RBCUA, WBCUA, BACTERIA, UACOMMENT  No results found for: TROPONINT, TROPONINI, BNP  No components found for: HGBA1C;2  No components found for: TSH;2  Imaging Results (All)       Procedure Component Value Units Date/Time    CT Abdomen Pelvis With Contrast [[id number]] Collected: 07/02/22 0013     Updated: 07/02/22 0013    Narrative:        Patient: ESME DAS  Time Out: 00:12  Exam(s): CT ABDOMEN + PELVIS With Contrast IV Amt: 85ml isovu 300    EXAM:    CT Abdomen and Pelvis With Intravenous Contrast    CLINICAL HISTORY:     Reason for exam: Right scrotal abscess. Please ensure that CT gets   down to the level of the scrotum..    TECHNIQUE:    Axial computed tomography images of the abdomen and pelvis with   intravenous contrast.  CTDI is 23.5 mGy and DLP is 1737.8 mGy-cm.  This   CT exam was performed according to the principle of ALARA (As Low As   Reasonably Achievable) by using one or more of the following dose   reduction techniques: automated exposure control, adjustment of the mA   and or kV according to patient size, and or use of iterative   reconstruction technique.    COMPARISON:    No relevant prior studies available.    FINDINGS:    Lung bases:  Unremarkable.  No mass.  No consolidation.     ABDOMEN:    Liver:  Hepatosplenomegaly and diffuse hepatic steatosis.    Gallbladder and bile ducts:  Unremarkable.  No calcified  stones.  No   ductal dilation.    Pancreas:  Unremarkable.  No mass.  No ductal dilation.    Spleen:  See above.    Adrenals:  Unremarkable.  No mass.    Kidneys and ureters:  Unremarkable.  No solid mass.  No hydronephrosis.    Stomach and bowel:  Unremarkable.  No obstruction.  No mucosal   thickening.     PELVIS:    Appendix:  No findings to suggest acute appendicitis.    Bladder:  Unremarkable.  No mass.    Reproductive:  Unremarkable as visualized.     ABDOMEN and PELVIS:    Intraperitoneal space:  Unremarkable.  No free air.  No significant   fluid collection.    Bones joints:  No acute fracture.  No dislocation.    Soft tissues:  Circumscribed round left cortical renal cyst measuring   up to 36 mm.  Stranding of the subcutaneous fat beginning at the level of   the base of the penis and extending posteriorly on the right to involve   the scrotum.  No loculated fluid collection.  Negative for soft tissue   gas.  Finding suggest inflammatory infectious process such as cellulitis.    Vasculature:  Unremarkable.  No abdominal aortic aneurysm.    Lymph nodes:  Unremarkable.  No enlarged lymph nodes.    IMPRESSION:         Inflammatory process anterior right perineum extending into the right   scrotum.  Nonloculated fluid and stranding but no evidence of abscess or   soft tissue gas.      Impression:          Electronically signed by Caio Viera DO, Diplomate American Board of   Radiology on 07-02-22 at 0012          Lab Results (last 7 days)       Procedure Component Value Units Date/Time    POC Glucose Once [[id number]]  (Abnormal) Collected: 07/03/22 1104    Specimen: Blood Updated: 07/03/22 1105     Glucose 222 mg/dL      Comment: Meter: [id number] : [id number] Hodges Chemell NA       Creatinine, Serum [[id number]]  (Normal) Collected: 07/03/22 0553    Specimen: Blood Updated: 07/03/22 0729     Creatinine 0.96 mg/dL      eGFR 98.7 mL/min/1.73      Comment: National Kidney Foundation and American Society of  Nephrology (ASN) Task Force recommended calculation based on the Chronic Kidney Disease Epidemiology Collaboration (CKD-EPI) equation refit without adjustment for race.       Narrative:      GFR Normal >60  Chronic Kidney Disease <60  Kidney Failure <15      Wound Culture - Wound, Scrotum [[id number]]  (Abnormal) Collected: 07/01/22 2203    Specimen: Wound from Scrotum Updated: 07/03/22 0726     Wound Culture Heavy growth (4+) Streptococcus agalactiae (Group B)     Comment: This organism is considered to be universally susceptible to penicillin.  No further antibiotic testing will be performed. If Clindamycin or Erythromycin is the drug of choice, notify the laboratory within 7 days to request susceptibility testing.         Scant growth (1+) Normal Skin Arlyn     Gram Stain Many (4+) WBCs per low power field      Moderate (3+) Gram positive cocci    POC Glucose Once [[id number]]  (Abnormal) Collected: 07/03/22 0724    Specimen: Blood Updated: 07/03/22 0725     Glucose 231 mg/dL      Comment: Meter: [id number] : [id number] [facility] NA       Blood Culture - Blood, Arm, Right [[id number]]  (Normal) Collected: 07/01/22 2131    Specimen: Blood from Arm, Right Updated: 07/02/22 2148     Blood Culture No growth at 24 hours    Blood Culture - Blood, Arm, Right [[id number]]  (Normal) Collected: 07/01/22 2124    Specimen: Blood from Arm, Right Updated: 07/02/22 2148     Blood Culture No growth at 24 hours    POC Glucose Once [[id number]]  (Abnormal) Collected: 07/02/22 1627    Specimen: Blood Updated: 07/02/22 1632     Glucose 257 mg/dL      Comment: Meter: [id number] : [id number] Yris Sanchez NA       POC Glucose Once [[id number]]  (Abnormal) Collected: 07/02/22 1152    Specimen: Blood Updated: 07/02/22 1153     Glucose 352 mg/dL      Comment: Meter: [id number] : [id number] Elisa Oswald NA       POC Glucose Once [[id number]]  (Abnormal) Collected: 07/02/22 0852    Specimen: Blood Updated: 07/02/22 0853      Glucose 349 mg/dL      Comment: Meter: [id number] : [id number] Gina Wright RN       Basic Metabolic Panel [[id number]]  (Abnormal) Collected: 07/02/22 0552    Specimen: Blood Updated: 07/02/22 0706     Glucose 270 mg/dL      BUN 10 mg/dL      Creatinine 0.86 mg/dL      Sodium 136 mmol/L      Potassium 4.2 mmol/L      Chloride 102 mmol/L      CO2 25.0 mmol/L      Calcium 8.5 mg/dL      BUN/Creatinine Ratio 11.6     Anion Gap 9.0 mmol/L      eGFR 108.1 mL/min/1.73      Comment: National Kidney Foundation and American Society of Nephrology (ASN) Task Force recommended calculation based on the Chronic Kidney Disease Epidemiology Collaboration (CKD-EPI) equation refit without adjustment for race.       Narrative:      GFR Normal >60  Chronic Kidney Disease <60  Kidney Failure <15      Hemoglobin A1c [[id number]]  (Abnormal) Collected: 07/02/22 0552    Specimen: Blood Updated: 07/02/22 0628     Hemoglobin A1C 12.50 %     Narrative:      Hemoglobin A1C Ranges:    Increased Risk for Diabetes  5.7% to 6.4%  Diabetes                     >= 6.5%  Diabetic Goal                < 7.0%    CBC (No Diff) [[id number]]  (Normal) Collected: 07/02/22 0552    Specimen: Blood Updated: 07/02/22 0620     WBC 5.84 10*3/mm3      RBC 4.95 10*6/mm3      Hemoglobin 14.6 g/dL      Hematocrit 42.7 %      MCV 86.3 fL      MCH 29.5 pg      MCHC 34.2 g/dL      RDW 13.0 %      RDW-SD 40.2 fl      MPV 10.2 fL      Platelets 190 10*3/mm3     STAT Lactic Acid, Reflex [[id number]]  (Normal) Collected: 07/01/22 2343    Specimen: Blood Updated: 07/02/22 0005     Lactate 1.8 mmol/L     POC Glucose Once [[id number]]  (Abnormal) Collected: 07/01/22 2352    Specimen: Blood Updated: 07/01/22 2353     Glucose 296 mg/dL      Comment: Meter: [id number] : [id number] [facility] PRE-OP / PRE-PROCEDURE SCREENING ORDER (NO ISOLATION) - Swab, Nasopharynx [[id number]]  (Normal) Collected: 07/01/22 2205    Specimen: Swab from Nasopharynx  Updated: 07/01/22 2247    Narrative:      The following orders were created for panel order COVID PRE-OP / PRE-PROCEDURE SCREENING ORDER (NO ISOLATION) - Swab, Nasopharynx.  Procedure                               Abnormality         Status                     ---------                               -----------         ------                     COVID-19,[facility] IN-HOUSE...[[id number]]  Normal              Final result                 Please view results for these tests on the individual orders.    COVID-19,[facility]-HOU[facility] CEPHEID/TREVOR NP SWAB IN TRANSPORT MEDIA 8-12 HR TAT - Swab, Nasopharynx [[id number]]  (Normal) Collected: 07/01/22 2205    Specimen: Swab from Nasopharynx Updated: 07/01/22 2247     COVID19 Not Detected    Narrative:      Fact sheet for providers: https://www.fda.gov/media/152763/download    Fact sheet for patients: https://www.fda.gov/media/834865/download    Test performed by PCR.    Comprehensive Metabolic Panel [[id number]]  (Abnormal) Collected: 07/01/22 2124    Specimen: Blood Updated: 07/01/22 2217     Glucose 487 mg/dL      BUN 10 mg/dL      Creatinine 1.35 mg/dL      Sodium 131 mmol/L      Potassium 4.1 mmol/L      Comment: Slight hemolysis detected by analyzer. Results may be affected.        Chloride 96 mmol/L      CO2 22.0 mmol/L      Calcium 8.9 mg/dL      Total Protein 6.3 g/dL      Albumin 3.90 g/dL      ALT (SGPT) 13 U/L      AST (SGOT) 13 U/L      Comment: Slight hemolysis detected by analyzer. Results may be affected.        Alkaline Phosphatase 167 U/L      Total Bilirubin 0.5 mg/dL      Globulin 2.4 gm/dL      A/G Ratio 1.6 g/dL      BUN/Creatinine Ratio 7.4     Anion Gap 13.0 mmol/L      eGFR 65.6 mL/min/1.73      Comment: National Kidney Foundation and American Society of Nephrology (ASN) Task Force recommended calculation based on the Chronic Kidney Disease Epidemiology Collaboration (CKD-EPI) equation refit without adjustment for race.       Narrative:      GFR Normal  ">60  Chronic Kidney Disease <60  Kidney Failure <15      Lactic Acid, Plasma [[id number]]  (Abnormal) Collected: 07/01/22 2124    Specimen: Blood Updated: 07/01/22 2214     Lactate 2.6 mmol/L     CBC & Differential [[id number]]  (Normal) Collected: 07/01/22 2124    Specimen: Blood Updated: 07/01/22 2141    Narrative:      The following orders were created for panel order CBC & Differential.  Procedure                               Abnormality         Status                     ---------                               -----------         ------                     CBC Auto Differential[[id number]]        Normal              Final result                 Please view results for these tests on the individual orders.    CBC Auto Differential [[id number]]  (Normal) Collected: 07/01/22 2124    Specimen: Blood Updated: 07/01/22 2141     WBC 6.34 10*3/mm3      RBC 5.27 10*6/mm3      Hemoglobin 15.7 g/dL      Hematocrit 45.4 %      MCV 86.1 fL      MCH 29.8 pg      MCHC 34.6 g/dL      RDW 13.2 %      RDW-SD 40.3 fl      MPV 10.0 fL      Platelets 203 10*3/mm3      Neutrophil % 61.5 %      Lymphocyte % 27.4 %      Monocyte % 8.8 %      Eosinophil % 1.3 %      Basophil % 0.5 %      Immature Grans % 0.5 %      Neutrophils, Absolute 3.90 10*3/mm3      Lymphocytes, Absolute 1.74 10*3/mm3      Monocytes, Absolute 0.56 10*3/mm3      Eosinophils, Absolute 0.08 10*3/mm3      Basophils, Absolute 0.03 10*3/mm3      Immature Grans, Absolute 0.03 10*3/mm3      nRBC 0.2 /100 WBC           /71 (BP Location: Left arm, Patient Position: Lying)   Pulse 87   Temp 98.1 °F (36.7 °C) (Oral)   Resp 18   Ht 185.4 cm (73\")   Wt 122 kg (270 lb)   SpO2 95%   BMI 35.62 kg/m²     Discharge Exam:  General Appearance:    Alert, cooperative, no distress                          Head:    Normocephalic, without obvious abnormality, atraumatic                          Eyes:                            Throat:   Lips, tongue, gums normal                  "         Neck:   Supple, symmetrical, trachea midline, no JVD                        Lungs:     Clear to auscultation bilaterally, respirations unlabored                Chest Wall:    No tenderness or deformity                        Heart:    Regular rate and rhythm, S1 and S2 normal, no murmur,no  Rub or gallop                  Abdomen:     Soft, non-tender, bowel sounds active, no masses, no organomegaly                  Extremities:   Extremities normal, atraumatic, no cyanosis or edema                             Skin:   Skin is warm and dry, scrotal abscess with cellulitis improving at discharge                  Neurologic:   no focal deficits noted     Disposition:  Home    Patient Instructions:      Discharge Medications        New Medications        Instructions Start Date   amoxicillin-clavulanate 875-125 MG per tablet  Commonly known as: Augmentin   1 tablet, Oral, 2 Times Daily      HYDROcodone-acetaminophen 5-325 MG per tablet  Commonly known as: NORCO   1 tablet, Oral, Every 4 Hours PRN      nystatin 920746 UNIT/GM cream  Commonly known as: MYCOSTATIN   1 application, Topical, Every 12 Hours Scheduled             Changes to Medications        Instructions Start Date   metFORMIN 500 MG tablet  Commonly known as: GLUCOPHAGE  What changed: how much to take   500 mg, Oral, 2 Times Daily With Meals      ondansetron 4 MG tablet  Commonly known as: ZOFRAN  What changed:   when to take this  reasons to take this   4 mg, Oral, Every 6 Hours             Continue These Medications        Instructions Start Date   atorvastatin 20 MG tablet  Commonly known as: LIPITOR   40 mg, Oral, Daily      LISINOPRIL PO   5 mg, Oral, Daily      omeprazole 20 MG capsule  Commonly known as: priLOSEC   20 mg, Oral, Daily      Steglatro 15 MG tablet  Generic drug: Ertugliflozin L-PyroglutamicAc   15 mg, Oral, Every Morning             No future appointments.   Follow-up Information       Antonio Murray MD Follow up in 1  week(s).    Specialty: Internal Medicine  Contact information:  [address]  [address]  [facility] [address]  [phone]               Misael Rmairez MD Follow up in 3 week(s).    Specialty: Urology  Contact information:  [address] [facility] [address]  [phone]                           Discharge Order (From admission, onward)       Start     Ordered    07/03/22 1454  Discharge patient  Once        Expected Discharge Date: 07/03/22    Discharge Disposition: Home or Self Care    Physician of Record for Attribution - Please select from Treatment Team: TJ CERRATO [[id number]]    Review needed by CMO to determine Physician of Record: No       Question Answer Comment   Physician of Record for Attribution - Please select from Treatment Team TJ CERRATO    Review needed by CMO to determine Physician of Record No        07/03/22 1457                    Total time spent discharging patient including evaluation,post hospitalization follow up,  medication and post hospitalization instructions and education total time exceeds 30 minutes.    Signed:  Tj Cerrato MD  7/3/2022  14:58 EDT

## 2022-07-03 NOTE — PROGRESS NOTES
[facility] Clinical Pharmacy Services: Piperacillin-Tazobactam Consult per BALAJI Damon' consult on , consult on order for 5 total days    Pt Name: Dino Dai   : 1975    Indication: SSTI/scrotal abscess  Urology notes ED team performed I&D in ER.       BC x2 prelim NG24h   Scrotum wound culture (final result): (4+) Streptococcus agalactiae (Group B)    Dr. Cerrato discontinued IV Vancomycin     Past Medical History:   Diagnosis Date    Diabetes mellitus (HCC)     Hyperlipidemia      Creatinine   Date Value Ref Range Status   2022 0.96 0.76 - 1.27 mg/dL Final   2022 0.86 0.76 - 1.27 mg/dL Final   2022 1.35 (H) 0.76 - 1.27 mg/dL Final   2021 1.19 (H) 0.73 - 1.18 mg/dL Final   2020 0.9 0.7 - 1.5 mg/dL Final   2018 0.8 0.7 - 1.5 mg/dL Final     BUN   Date Value Ref Range Status   2022 10 6 - 20 mg/dL Final   2021 23 (H) 9 - 21 mg/dL Final     Estimated Creatinine Clearance: 131.5 mL/min (by C-G formula based on SCr of 0.96 mg/dL).    Lab Results   Component Value Date    WBC 5.84 2022     Temp Readings from Last 3 Encounters:   22 98.1 °F (36.7 °C) (Oral)   16 97.2 °F (36.2 °C) (Tympanic)      Assessment/Plan  Estimated CrCl >20 mL/min at this time; BMI 35.62 kg/m2, weight 122kg  Adjust Zosyn to 4.5gm IV q8h EI as weight >120kg per [facility] dosing guidelines    Pharmacy will discontinue the Pharmacy to Dose consult for Zosyn at this time. Renal function will continue to be monitored and dosing adjustments will be made by pharmacy based on renal function if necessary    Maxwell Jade, PharmD  Clinical Pharmacist

## 2022-07-04 NOTE — CASE MANAGEMENT/SOCIAL WORK
Case Management Discharge Note      Final Note: home         Selected Continued Care - Discharged on 7/3/2022 Admission date: 7/1/2022 - Discharge disposition: Home or Self Care    Destination    No services have been selected for the patient.              Durable Medical Equipment    No services have been selected for the patient.              Dialysis/Infusion    No services have been selected for the patient.              Home Medical Care    No services have been selected for the patient.              Therapy    No services have been selected for the patient.              Community Resources    No services have been selected for the patient.              Community & DME    No services have been selected for the patient.                  Transportation Services  Private: Car    Final Discharge Disposition Code: 01 - home or self-care

## 2022-07-06 LAB
BACTERIA SPEC AEROBE CULT: NORMAL
BACTERIA SPEC AEROBE CULT: NORMAL

## 2023-09-25 ENCOUNTER — HOSPITAL ENCOUNTER (EMERGENCY)
Facility: HOSPITAL | Age: 48
Discharge: HOME | End: 2023-09-26
Attending: EMERGENCY MEDICINE | Admitting: EMERGENCY MEDICINE
Payer: MEDICAID

## 2023-09-25 VITALS
TEMPERATURE: 98.7 F | WEIGHT: 269 LBS | HEART RATE: 106 BPM | RESPIRATION RATE: 18 BRPM | HEIGHT: 73 IN | SYSTOLIC BLOOD PRESSURE: 139 MMHG | DIASTOLIC BLOOD PRESSURE: 94 MMHG | BODY MASS INDEX: 35.65 KG/M2 | OXYGEN SATURATION: 98 %

## 2023-09-25 DIAGNOSIS — E11.65 TYPE 2 DIABETES MELLITUS WITH HYPERGLYCEMIA, WITHOUT LONG-TERM CURRENT USE OF INSULIN: ICD-10-CM

## 2023-09-25 DIAGNOSIS — L02.611 ABSCESS OF RIGHT FOOT: Primary | ICD-10-CM

## 2023-09-25 LAB
ALBUMIN SERPL-MCNC: 4.9 G/DL (ref 3.5–5.2)
ALBUMIN/GLOB SERPL: 1.8 G/DL
ALP SERPL-CCNC: 196 U/L (ref 39–117)
ALT SERPL W P-5'-P-CCNC: 17 U/L (ref 1–41)
ANION GAP SERPL CALCULATED.3IONS-SCNC: 10 MMOL/L (ref 5–15)
AST SERPL-CCNC: 13 U/L (ref 1–40)
BASOPHILS # BLD AUTO: 0.04 10*3/MM3 (ref 0–0.2)
BASOPHILS NFR BLD AUTO: 0.4 % (ref 0–1.5)
BILIRUB SERPL-MCNC: 0.6 MG/DL (ref 0–1.2)
BUN SERPL-MCNC: 9 MG/DL (ref 6–20)
BUN/CREAT SERPL: 8.7 (ref 7–25)
CALCIUM SPEC-SCNC: 10.2 MG/DL (ref 8.6–10.5)
CHLORIDE SERPL-SCNC: 99 MMOL/L (ref 98–107)
CO2 SERPL-SCNC: 29 MMOL/L (ref 22–29)
CREAT SERPL-MCNC: 1.04 MG/DL (ref 0.76–1.27)
D-LACTATE SERPL-SCNC: 1.3 MMOL/L (ref 0.5–2)
DEPRECATED RDW RBC AUTO: 41.5 FL (ref 37–54)
EGFRCR SERPLBLD CKD-EPI 2021: 88.6 ML/MIN/1.73
EOSINOPHIL # BLD AUTO: 0.09 10*3/MM3 (ref 0–0.4)
EOSINOPHIL NFR BLD AUTO: 0.8 % (ref 0.3–6.2)
ERYTHROCYTE [DISTWIDTH] IN BLOOD BY AUTOMATED COUNT: 13.5 % (ref 12.3–15.4)
GLOBULIN UR ELPH-MCNC: 2.8 GM/DL
GLUCOSE SERPL-MCNC: 241 MG/DL (ref 65–99)
HCT VFR BLD AUTO: 52.1 % (ref 37.5–51)
HGB BLD-MCNC: 18.1 G/DL (ref 13–17.7)
IMM GRANULOCYTES # BLD AUTO: 0.05 10*3/MM3 (ref 0–0.05)
IMM GRANULOCYTES NFR BLD AUTO: 0.5 % (ref 0–0.5)
LYMPHOCYTES # BLD AUTO: 3.13 10*3/MM3 (ref 0.7–3.1)
LYMPHOCYTES NFR BLD AUTO: 29.4 % (ref 19.6–45.3)
MCH RBC QN AUTO: 29.4 PG (ref 26.6–33)
MCHC RBC AUTO-ENTMCNC: 34.7 G/DL (ref 31.5–35.7)
MCV RBC AUTO: 84.7 FL (ref 79–97)
MONOCYTES # BLD AUTO: 0.62 10*3/MM3 (ref 0.1–0.9)
MONOCYTES NFR BLD AUTO: 5.8 % (ref 5–12)
NEUTROPHILS NFR BLD AUTO: 6.71 10*3/MM3 (ref 1.7–7)
NEUTROPHILS NFR BLD AUTO: 63.1 % (ref 42.7–76)
NRBC BLD AUTO-RTO: 0 /100 WBC (ref 0–0.2)
PLATELET # BLD AUTO: 270 10*3/MM3 (ref 140–450)
PMV BLD AUTO: 10.1 FL (ref 6–12)
POTASSIUM SERPL-SCNC: 3.8 MMOL/L (ref 3.5–5.2)
PROCALCITONIN SERPL-MCNC: 0.14 NG/ML (ref 0–0.25)
PROT SERPL-MCNC: 7.7 G/DL (ref 6–8.5)
RBC # BLD AUTO: 6.15 10*6/MM3 (ref 4.14–5.8)
SODIUM SERPL-SCNC: 138 MMOL/L (ref 136–145)
WBC NRBC COR # BLD: 10.64 10*3/MM3 (ref 3.4–10.8)

## 2023-09-25 PROCEDURE — 87040 BLOOD CULTURE FOR BACTERIA: CPT | Performed by: EMERGENCY MEDICINE

## 2023-09-25 PROCEDURE — 85025 COMPLETE CBC W/AUTO DIFF WBC: CPT | Performed by: EMERGENCY MEDICINE

## 2023-09-25 PROCEDURE — 87147 CULTURE TYPE IMMUNOLOGIC: CPT | Performed by: EMERGENCY MEDICINE

## 2023-09-25 PROCEDURE — 36415 COLL VENOUS BLD VENIPUNCTURE: CPT

## 2023-09-25 PROCEDURE — 87070 CULTURE OTHR SPECIMN AEROBIC: CPT | Performed by: EMERGENCY MEDICINE

## 2023-09-25 PROCEDURE — 87186 SC STD MICRODIL/AGAR DIL: CPT | Performed by: EMERGENCY MEDICINE

## 2023-09-25 PROCEDURE — 84145 PROCALCITONIN (PCT): CPT | Performed by: EMERGENCY MEDICINE

## 2023-09-25 PROCEDURE — 87205 SMEAR GRAM STAIN: CPT | Performed by: EMERGENCY MEDICINE

## 2023-09-25 PROCEDURE — 83605 ASSAY OF LACTIC ACID: CPT | Performed by: EMERGENCY MEDICINE

## 2023-09-25 PROCEDURE — 80053 COMPREHEN METABOLIC PANEL: CPT | Performed by: EMERGENCY MEDICINE

## 2023-09-25 PROCEDURE — 99283 EMERGENCY DEPT VISIT LOW MDM: CPT

## 2023-09-25 RX ORDER — LIDOCAINE HYDROCHLORIDE AND EPINEPHRINE 10; 10 MG/ML; UG/ML
20 INJECTION, SOLUTION INFILTRATION; PERINEURAL ONCE
Status: COMPLETED | OUTPATIENT
Start: 2023-09-25 | End: 2023-09-25

## 2023-09-25 RX ORDER — SULFAMETHOXAZOLE AND TRIMETHOPRIM 800; 160 MG/1; MG/1
1 TABLET ORAL 2 TIMES DAILY
Qty: 20 TABLET | Refills: 0 | Status: SHIPPED | OUTPATIENT
Start: 2023-09-25

## 2023-09-25 RX ORDER — SODIUM CHLORIDE 0.9 % (FLUSH) 0.9 %
10 SYRINGE (ML) INJECTION AS NEEDED
Status: DISCONTINUED | OUTPATIENT
Start: 2023-09-25 | End: 2023-09-26 | Stop reason: HOSPADM

## 2023-09-25 RX ADMIN — LIDOCAINE HYDROCHLORIDE,EPINEPHRINE BITARTRATE 20 ML: 10; .01 INJECTION, SOLUTION INFILTRATION; PERINEURAL at 21:58

## 2023-09-26 NOTE — ED PROVIDER NOTES
" EMERGENCY DEPARTMENT ENCOUNTER    Room Number:  20/20  Date seen:  9/25/2023  PCP: Antonio Murray MD  Historian: Patient, son at bedside      HPI:  Chief Complaint: Right foot redness and swelling  A complete HPI/ROS/PMH/PSH/SH/FH are unobtainable due to:   Context: Dino Dai is a 48 y.o. male who presents to the ED c/o right foot redness and swelling.  Patient reports that he injured his foot about a month ago while \"Bush hogging\".  About 3 to 4 days ago he developed some redness and swelling which is progressively worsened.  Denies fever.  No chest pain or trouble breathing.  Patient works as a .  He does smoke.      MEDICAL RECORD REVIEW (non ED)  I reviewed prior medical records note patient has a history of non-insulin-dependent diabetes mellitus.  He was hospitalized 1 year ago with scrotal abscess.    PAST MEDICAL HISTORY  Active Ambulatory Problems     Diagnosis Date Noted    Scrotal abscess 07/02/2022    Lactic acid acidosis 07/02/2022    Type 2 diabetes mellitus with hyperglycemia, without long-term current use of insulin 07/02/2022    Hyperlipidemia      Resolved Ambulatory Problems     Diagnosis Date Noted    No Resolved Ambulatory Problems     Past Medical History:   Diagnosis Date    Diabetes mellitus          PAST SURGICAL HISTORY  Past Surgical History:   Procedure Laterality Date    BACK SURGERY           FAMILY HISTORY  No family history on file.      SOCIAL HISTORY  Social History     Socioeconomic History    Marital status:    Tobacco Use    Smoking status: Every Day     Packs/day: 1.00     Types: Cigarettes   Substance and Sexual Activity    Alcohol use: No    Drug use: No         ALLERGIES  Codeine        REVIEW OF SYSTEMS  Review of Systems   Constitutional:  Negative for fever.   Respiratory:  Negative for shortness of breath.    Cardiovascular:  Negative for chest pain.   Skin:  Positive for rash (As per HPI).   All other systems reviewed and are negative. "         PHYSICAL EXAM  ED Triage Vitals   Temp Heart Rate Resp BP SpO2   09/25/23 2011 09/25/23 2011 09/25/23 2011 09/25/23 2012 09/25/23 2011   98.7 °F (37.1 °C) 106 18 139/94 98 %      Temp src Heart Rate Source Patient Position BP Location FiO2 (%)   -- -- -- -- --              Physical Exam    GENERAL: Alert male in no obvious distress.  Triage vitals reviewed notable for temperature 98.7.  Heart rate blood pressure and O2 sats reviewed  HENT: nares patent  EYES: no scleral icterus  CV: regular rhythm, regular rate-no murmur  RESPIRATORY: normal effort, clear to auscultation bilaterally  ABDOMEN: soft  MUSCULOSKELETAL: no deformity  NEURO: Strength sensation and coordination are grossly intact.  Speech and mentation are unremarkable  SKIN: Examination of the right foot reveals a roughly 4 x 4 centimeter swelling to the lateral surface of the top of the right foot.  Exam is consistent with cutaneous abscess with fluctuance, erythema and warmth.      Vital signs and nursing notes reviewed.          LAB RESULTS  Recent Results (from the past 24 hour(s))   Comprehensive Metabolic Panel    Collection Time: 09/25/23  9:26 PM    Specimen: Blood   Result Value Ref Range    Glucose 241 (H) 65 - 99 mg/dL    BUN 9 6 - 20 mg/dL    Creatinine 1.04 0.76 - 1.27 mg/dL    Sodium 138 136 - 145 mmol/L    Potassium 3.8 3.5 - 5.2 mmol/L    Chloride 99 98 - 107 mmol/L    CO2 29.0 22.0 - 29.0 mmol/L    Calcium 10.2 8.6 - 10.5 mg/dL    Total Protein 7.7 6.0 - 8.5 g/dL    Albumin 4.9 3.5 - 5.2 g/dL    ALT (SGPT) 17 1 - 41 U/L    AST (SGOT) 13 1 - 40 U/L    Alkaline Phosphatase 196 (H) 39 - 117 U/L    Total Bilirubin 0.6 0.0 - 1.2 mg/dL    Globulin 2.8 gm/dL    A/G Ratio 1.8 g/dL    BUN/Creatinine Ratio 8.7 7.0 - 25.0    Anion Gap 10.0 5.0 - 15.0 mmol/L    eGFR 88.6 >60.0 mL/min/1.73   Lactic Acid, Plasma    Collection Time: 09/25/23  9:26 PM    Specimen: Blood   Result Value Ref Range    Lactate 1.3 0.5 - 2.0 mmol/L   Procalcitonin     Collection Time: 09/25/23  9:26 PM    Specimen: Blood   Result Value Ref Range    Procalcitonin 0.14 0.00 - 0.25 ng/mL   CBC Auto Differential    Collection Time: 09/25/23  9:26 PM    Specimen: Blood   Result Value Ref Range    WBC 10.64 3.40 - 10.80 10*3/mm3    RBC 6.15 (H) 4.14 - 5.80 10*6/mm3    Hemoglobin 18.1 (H) 13.0 - 17.7 g/dL    Hematocrit 52.1 (H) 37.5 - 51.0 %    MCV 84.7 79.0 - 97.0 fL    MCH 29.4 26.6 - 33.0 pg    MCHC 34.7 31.5 - 35.7 g/dL    RDW 13.5 12.3 - 15.4 %    RDW-SD 41.5 37.0 - 54.0 fl    MPV 10.1 6.0 - 12.0 fL    Platelets 270 140 - 450 10*3/mm3    Neutrophil % 63.1 42.7 - 76.0 %    Lymphocyte % 29.4 19.6 - 45.3 %    Monocyte % 5.8 5.0 - 12.0 %    Eosinophil % 0.8 0.3 - 6.2 %    Basophil % 0.4 0.0 - 1.5 %    Immature Grans % 0.5 0.0 - 0.5 %    Neutrophils, Absolute 6.71 1.70 - 7.00 10*3/mm3    Lymphocytes, Absolute 3.13 (H) 0.70 - 3.10 10*3/mm3    Monocytes, Absolute 0.62 0.10 - 0.90 10*3/mm3    Eosinophils, Absolute 0.09 0.00 - 0.40 10*3/mm3    Basophils, Absolute 0.04 0.00 - 0.20 10*3/mm3    Immature Grans, Absolute 0.05 0.00 - 0.05 10*3/mm3    nRBC 0.0 0.0 - 0.2 /100 WBC       Ordered the above labs and independently interpreted results. My findings will be discussed in the medical decision making section below        RADIOLOGY  No Radiology Exams Resulted Within Past 24 Hours          PROCEDURES  Incision & Drainage    Date/Time: 9/25/2023 9:52 PM  Performed by: Aristides Villar MD  Authorized by: Aristides Villar MD     Consent:     Consent obtained:  Emergent situation    Risks discussed:  Incomplete drainage and infection  Universal protocol:     Patient identity confirmed:  Verbally with patient  Location:     Type:  Abscess    Location:  Lower extremity    Lower extremity location:  Foot    Foot location:  R foot  Pre-procedure details:     Skin preparation:  Povidone-iodine  Sedation:     Sedation type:  None  Anesthesia:     Anesthesia method:  Local infiltration    Local  anesthetic:  Lidocaine 1% WITH epi  Procedure type:     Complexity:  Simple  Procedure details:     Ultrasound guidance: no      Needle aspiration: yes      Needle size:  18 G    Incision types:  Single straight    Incision depth:  Subcutaneous    Wound management:  Probed and deloculated    Drainage:  Purulent    Drainage amount:  Moderate    Wound treatment:  Wound left open    Packing materials:  None  Post-procedure details:     Procedure completion:  Tolerated well, no immediate complications          MEDICATIONS GIVEN IN ER  Medications   sodium chloride 0.9 % flush 10 mL (has no administration in time range)   lidocaine 1% - EPINEPHrine 1:123683 (XYLOCAINE W/EPI) 1 %-1:325023 injection 20 mL (20 mL Injection Given 9/25/23 2158)               MEDICAL DECISION MAKING, PROGRESS, and CONSULTS    All labs have been independently reviewed by me.  All radiology studies have been reviewed by me and I have also reviewed the radiology report.   EKG's independently viewed and interpreted by me.  Discussion below represents my analysis of pertinent findings related to patient's condition, differential diagnosis, treatment plan and final disposition.      Additional sources:  - Discussed/ obtained information from independent historians: Son at bedside    - External (non-ED) record review: Please see documented above    - Chronic or social conditions impacting care: Diabetes    - Shared decision making: I discussed ED evaluation and treatment plan with patient who is in agreement.  Patient presents with right foot redness swelling and pain.    Exam consistent with abscess.  Labs reviewed and notable for normal white blood cell count.    Patient underwent I&D with roughly 5 cc of purulent drainage removed.  Wound was probed and deloculated.  Wound culture sent.    Consider admission given patient's history of diabetes and prior admission with abscess but given the fairly thorough drainage, afebrile and normal white count  suspect patient to be treated as outpatient.  Wound culture sent.      Orders placed during this visit:  Orders Placed This Encounter   Procedures    Incision & Drainage    Blood Culture - Blood,    Blood Culture - Blood,    Wound Culture - Aspirate, Foot, Right    Comprehensive Metabolic Panel    Lactic Acid, Plasma    Procalcitonin    CBC Auto Differential    Insert Peripheral IV    CBC & Differential           Differential diagnosis:    Please see as documented below in ED course      Independent interpretation of labs, radiology studies, and discussions with consultants:  ED Course as of 09/25/23 2221   Mon Sep 25, 2023   [age] [id number]-old male with history of non-insulin-dependent diabetes presents with redness and swelling to the right foot.    On exam he is well-appearing but has pretty significant cutaneous abscess to the right foot.    Assessment we will go ahead and check some blood work to make sure there is no signs of systemic infection although patient is well-appearing and afebrile.  We will go ahead and numb up the foot and try to open this abscess to drain it and culture. [DB]   2151 CBC reviewed notable for normal white count.  Patient does have an elevated hemoglobin of 18.1.  Plate count normal.    As I got a very good drainage and patient is well-appearing I do not feel he needs inpatient care at this time although his risk is increased given the fact that he is diabetic and the foot is the focus of infection [DB]      ED Course User Index  [DB] Aristides Villar MD               DIAGNOSIS  Final diagnoses:   Abscess of right foot   Type 2 diabetes mellitus with hyperglycemia, without long-term current use of insulin         DISPOSITION  DISCHARGE    Patient discharged in stable condition.    Reviewed implications of results, diagnosis, meds, responsibility to follow up, warning signs and symptoms of possible worsening, potential complications and reasons to return to ER, including increased  pain, fever or as needed.    Patient/Family voiced understanding of above instructions.    Discussed plan for discharge, as there is no emergent indication for admission. Patient referred to primary care provider for BP management due to today's BP. Pt/family is agreeable and understands need for follow up and repeat testing.  Pt is aware that discharge does not mean that nothing is wrong but it indicates no emergency is present that requires admission and they must continue care with follow-up as given below or physician of their choice.     FOLLOW-UP  Antonio Murray MD  [address] [facility] [address]  [facility] [id number] [phone]    In 3 days  If Not Better         Medication List        New Prescriptions      sulfamethoxazole-trimethoprim 800-160 MG per tablet  Commonly known as: Bactrim DS  Take 1 tablet by mouth 2 (Two) Times a Day.            Changed      metFORMIN 500 MG tablet  Commonly known as: GLUCOPHAGE  Take 1 tablet by mouth 2 (Two) Times a Day With Meals.  What changed: how much to take     ondansetron 4 MG tablet  Commonly known as: ZOFRAN  Take 1 tablet by mouth Every 6 (Six) Hours.  What changed:   when to take this  reasons to take this               Where to Get Your Medications        These medications were sent to [id number] - [place], [place] - [address] AT [facility] - [phone]  - [phone]   [address] [facility] [address]      Phone: [phone]   sulfamethoxazole-trimethoprim 800-160 MG per tablet                   Latest Documented Vital Signs:  As of 22:21 EDT  BP- 139/94 HR- 106 Temp- 98.7 °F (37.1 °C) O2 sat- 98%              --    Please note that portions of this were completed with a voice recognition program.       Note Disclaimer: At [facility], we believe that sharing information builds trust and better relationships. You are receiving this note because you are receiving care at [facility] or  recently visited. It is possible you will see health information before a provider has talked with you about it. This kind of information can be easy to misunderstand. To help you fully understand what it means for your health, we urge you to discuss this note with your provider.             Aristides Villar MD  09/25/23 6558

## 2023-09-26 NOTE — DISCHARGE INSTRUCTIONS
Please try to keep your foot elevated and clean over the next 48 hours.  Take antibiotic as directed and finish the full course.    Do not hesitate to return for increased pain, swelling, fever or as needed

## 2023-09-28 LAB
BACTERIA SPEC AEROBE CULT: ABNORMAL
GRAM STN SPEC: ABNORMAL
GRAM STN SPEC: ABNORMAL

## 2023-09-30 LAB
BACTERIA SPEC AEROBE CULT: NORMAL
BACTERIA SPEC AEROBE CULT: NORMAL

## 2024-01-10 NOTE — ED PROVIDER NOTES
MD ATTESTATION NOTE    The ANICETO and I have discussed this patient's history, physical exam, and treatment plan.    I provided a substantive portion of the care of this patient. I personally performed the physical exam, in its entirety. The attached note describes my personal findings.      Dino Dai is a 46 y.o. male who presents to the ED c/o abscess to his scrotum.  He first noticed symptoms 1 week ago.  He also reports swelling to his penile shaft.      On exam:  GENERAL: not distressed  HENT: nares patent  EYES: no scleral icterus  CV: regular rhythm, regular rate  RESPIRATORY: normal effort  : Scrotal abscess with drainage that is located to the right inferior portion of his scrotum.  He has swelling to the foreskin  MUSCULOSKELETAL: no deformity  NEURO: alert, moves all extremities, follows commands  SKIN: warm, dry    Labs  Recent Results (from the past 24 hour(s))   Lactic Acid, Plasma    Collection Time: 07/01/22  9:24 PM    Specimen: Blood   Result Value Ref Range    Lactate 2.6 (C) 0.5 - 2.0 mmol/L   CBC Auto Differential    Collection Time: 07/01/22  9:24 PM    Specimen: Blood   Result Value Ref Range    WBC 6.34 3.40 - 10.80 10*3/mm3    RBC 5.27 4.14 - 5.80 10*6/mm3    Hemoglobin 15.7 13.0 - 17.7 g/dL    Hematocrit 45.4 37.5 - 51.0 %    MCV 86.1 79.0 - 97.0 fL    MCH 29.8 26.6 - 33.0 pg    MCHC 34.6 31.5 - 35.7 g/dL    RDW 13.2 12.3 - 15.4 %    RDW-SD 40.3 37.0 - 54.0 fl    MPV 10.0 6.0 - 12.0 fL    Platelets 203 140 - 450 10*3/mm3    Neutrophil % 61.5 42.7 - 76.0 %    Lymphocyte % 27.4 19.6 - 45.3 %    Monocyte % 8.8 5.0 - 12.0 %    Eosinophil % 1.3 0.3 - 6.2 %    Basophil % 0.5 0.0 - 1.5 %    Immature Grans % 0.5 0.0 - 0.5 %    Neutrophils, Absolute 3.90 1.70 - 7.00 10*3/mm3    Lymphocytes, Absolute 1.74 0.70 - 3.10 10*3/mm3    Monocytes, Absolute 0.56 0.10 - 0.90 10*3/mm3    Eosinophils, Absolute 0.08 0.00 - 0.40 10*3/mm3    Basophils, Absolute 0.03 0.00 - 0.20 10*3/mm3    Immature Grans, Absolute  0.03 0.00 - 0.05 10*3/mm3    nRBC 0.2 0.0 - 0.2 /100 WBC       Radiology  CT Abdomen Pelvis With Contrast    Result Date: 7/2/2022  Patient: ESME DAS  Time Out: 00:12 Exam(s): CT ABDOMEN + PELVIS With Contrast IV Amt: 85ml isovu 300 EXAM:   CT Abdomen and Pelvis With Intravenous Contrast CLINICAL HISTORY:    Reason for exam: Right scrotal abscess. Please ensure that CT gets down to the level of the scrotum.. TECHNIQUE:   Axial computed tomography images of the abdomen and pelvis with intravenous contrast.  CTDI is 23.5 mGy and DLP is 1737.8 mGy-cm.  This CT exam was performed according to the principle of ALARA (As Low As Reasonably Achievable) by using one or more of the following dose reduction techniques: automated exposure control, adjustment of the mA and or kV according to patient size, and or use of iterative reconstruction technique. COMPARISON:   No relevant prior studies available. FINDINGS:   Lung bases:  Unremarkable.  No mass.  No consolidation.  ABDOMEN:   Liver:  Hepatosplenomegaly and diffuse hepatic steatosis.   Gallbladder and bile ducts:  Unremarkable.  No calcified stones.  No ductal dilation.   Pancreas:  Unremarkable.  No mass.  No ductal dilation.   Spleen:  See above.   Adrenals:  Unremarkable.  No mass.   Kidneys and ureters:  Unremarkable.  No solid mass.  No hydronephrosis.   Stomach and bowel:  Unremarkable.  No obstruction.  No mucosal thickening.  PELVIS:   Appendix:  No findings to suggest acute appendicitis.   Bladder:  Unremarkable.  No mass.   Reproductive:  Unremarkable as visualized.  ABDOMEN and PELVIS:   Intraperitoneal space:  Unremarkable.  No free air.  No significant fluid collection.   Bones joints:  No acute fracture.  No dislocation.   Soft tissues:  Circumscribed round left cortical renal cyst measuring up to 36 mm.  Stranding of the subcutaneous fat beginning at the level of the base of the penis and extending posteriorly on the right to involve the scrotum.  No  loculated fluid collection.  Negative for soft tissue gas.  Finding suggest inflammatory infectious process such as cellulitis.   Vasculature:  Unremarkable.  No abdominal aortic aneurysm.   Lymph nodes:  Unremarkable.  No enlarged lymph nodes. IMPRESSION:       Inflammatory process anterior right perineum extending into the right scrotum.  Nonloculated fluid and stranding but no evidence of abscess or soft tissue gas.     Electronically signed by Caio Viera DO, Diplomate American Board of Radiology on 07-02-22 at 0012      Medical Decision Making:  ED Course as of 07/02/22 0835   Fri Jul 01, 2022 2121 Patient presents with 1 week history of worsening abscess to right scrotum.  It is now open and draining per patient.  He reports a subjective fever last night.  He is a diabetic.  High concern for potential development of Tere's gangrene.  Plan to check basic labs, CT scan pelvis, and give antibiotics. [EE]   2204 WBC: 6.34 [EE]   2204 Hemoglobin: 15.7 [EE]   2217 Lactate(!!): 2.6 [EE]   2217 Creatinine(!): 1.35 [EE]   2333 Patient states he has not had his diabetes medicines in greater than 1 month.  Given his noncompliance and hyperglycemia, we will admit for further evaluation. [EE]   Sat Jul 02, 2022   0006 Glucose(!): 296 [EE]   0040 I discussed case with Araceli Robledo, nurse practitioner with [facility].  She agrees admit the patient to Dr. Norman. [EE]      ED Course User Index  [EE] Dashawn Flor, PA           PPE: Both the patient and I wore a surgical mask throughout the entire patient encounter. I wore protective goggles.     Diagnosis  Final diagnoses:   Scrotal abscess   Type 2 diabetes mellitus with hyperglycemia, without long-term current use of insulin (HCC)   Lactic acid acidosis        Alvin Orozco II, MD  07/02/22 0872     Detail Level: Zone

## 2025-03-22 ENCOUNTER — HOSPITAL ENCOUNTER (EMERGENCY)
Facility: HOSPITAL | Age: 50
Discharge: HOME | End: 2025-03-23
Attending: EMERGENCY MEDICINE | Admitting: EMERGENCY MEDICINE
Payer: MEDICAID

## 2025-03-22 DIAGNOSIS — S43.401A SPRAIN OF RIGHT SHOULDER, UNSPECIFIED SHOULDER SPRAIN TYPE, INITIAL ENCOUNTER: ICD-10-CM

## 2025-03-22 DIAGNOSIS — S46.911A STRAIN OF RIGHT SHOULDER, INITIAL ENCOUNTER: Primary | ICD-10-CM

## 2025-03-22 PROCEDURE — 99283 EMERGENCY DEPT VISIT LOW MDM: CPT

## 2025-03-22 NOTE — Clinical Note
[facility] EMERGENCY DEPARTMENT  [address] [facility] [address]  Phone: [phone]    Dino Dai was seen and treated in our emergency department on 3/22/2025.  He may return to work on 03/26/2025.         Thank you for choosing [facility].    Bethany Barrios MD

## 2025-03-23 ENCOUNTER — APPOINTMENT (OUTPATIENT)
Dept: GENERAL RADIOLOGY | Facility: HOSPITAL | Age: 50
End: 2025-03-23
Payer: MEDICAID

## 2025-03-23 VITALS
RESPIRATION RATE: 18 BRPM | WEIGHT: 245 LBS | TEMPERATURE: 96.7 F | BODY MASS INDEX: 32.47 KG/M2 | SYSTOLIC BLOOD PRESSURE: 126 MMHG | HEART RATE: 83 BPM | OXYGEN SATURATION: 96 % | DIASTOLIC BLOOD PRESSURE: 85 MMHG | HEIGHT: 73 IN

## 2025-03-23 PROCEDURE — 73030 X-RAY EXAM OF SHOULDER: CPT

## 2025-03-23 RX ORDER — LIDOCAINE 50 MG/G
1 PATCH TOPICAL EVERY 24 HOURS
Qty: 15 EACH | Refills: 0 | Status: SHIPPED | OUTPATIENT
Start: 2025-03-23

## 2025-03-23 RX ORDER — LIDOCAINE 4 G/G
1 PATCH TOPICAL EVERY 24 HOURS
Status: DISCONTINUED | OUTPATIENT
Start: 2025-03-23 | End: 2025-03-23 | Stop reason: HOSPADM

## 2025-03-23 RX ORDER — HYDROCODONE BITARTRATE AND ACETAMINOPHEN 5; 325 MG/1; MG/1
1 TABLET ORAL ONCE
Refills: 0 | Status: COMPLETED | OUTPATIENT
Start: 2025-03-23 | End: 2025-03-23

## 2025-03-23 RX ORDER — IBUPROFEN 800 MG/1
800 TABLET, FILM COATED ORAL EVERY 8 HOURS PRN
Qty: 20 TABLET | Refills: 0 | Status: SHIPPED | OUTPATIENT
Start: 2025-03-23

## 2025-03-23 RX ORDER — LIDOCAINE 4 G/G
1 PATCH TOPICAL
Status: DISCONTINUED | OUTPATIENT
Start: 2025-03-23 | End: 2025-03-23

## 2025-03-23 RX ORDER — HYDROCODONE BITARTRATE AND ACETAMINOPHEN 5; 325 MG/1; MG/1
1 TABLET ORAL EVERY 6 HOURS PRN
Qty: 8 TABLET | Refills: 0 | Status: SHIPPED | OUTPATIENT
Start: 2025-03-23

## 2025-03-23 RX ADMIN — LIDOCAINE 1 PATCH: 4 PATCH TOPICAL at 00:58

## 2025-03-23 RX ADMIN — HYDROCODONE BITARTRATE AND ACETAMINOPHEN 1 TABLET: 5; 325 TABLET ORAL at 00:58

## 2025-03-23 NOTE — ED PROVIDER NOTES
EMERGENCY DEPARTMENT ENCOUNTER    Room Number:  23/23  PCP: Antonio Murray MD  Independent Historians: Patient    HPI:  Chief Complaint: had concerns including Shoulder Injury.      A complete HPI/ROS/PMH/PSH/SH/FH are unobtainable due to: None    Chronic or social conditions impacting patient care (Social Determinants of Health): None      Context: Dino Dai is a 49 y.o. male with a medical history of diabetes, hyperlipidemia who presents to the ED c/o acute right shoulder injury.  Patient was climbing a ladder 1 week ago when his right arm got caught in a rope.  Patient then fell down the last 3 steps of the ladder with his arm still hung.  Patient has had right shoulder pain since then.  He denies any head injury, chest pain, shortness of breath, numbness or tingling in the right hand or arm.  Patient denies any previous injuries to the right shoulder and denies any prior surgeries on it.        Review of prior external notes (non-ED) -and- Review of prior external test results outside of this encounter: Patient with a CT abdomen and pelvis done July 1, 2022.  Patient with right scrotal and right perineal inflammatory process with no abscess or soft tissue gas    Prescription drug monitoring program review: KATE reviewed by Bethany Barrios MD KASPER query complete and reviewed. Treatment plan to include limited course of prescribed controlled substance. Risks including addiction, benefits, and alternatives presented to patient.    PAST MEDICAL HISTORY  Active Ambulatory Problems     Diagnosis Date Noted    Scrotal abscess 07/02/2022    Lactic acid acidosis 07/02/2022    Type 2 diabetes mellitus with hyperglycemia, without long-term current use of insulin 07/02/2022    Hyperlipidemia      Resolved Ambulatory Problems     Diagnosis Date Noted    No Resolved Ambulatory Problems     Past Medical History:   Diagnosis Date    Diabetes mellitus          PAST SURGICAL HISTORY  Past Surgical  "History:   Procedure Laterality Date    BACK SURGERY           FAMILY HISTORY  No family history on file.      SOCIAL HISTORY  Social History     Socioeconomic History    Marital status:    Tobacco Use    Smoking status: Every Day     Current packs/day: 1.00     Types: Cigarettes   Substance and Sexual Activity    Alcohol use: No    Drug use: No         ALLERGIES  Codeine        REVIEW OF SYSTEMS  Review of Systems  Included in HPI  All systems reviewed and negative except for those discussed in HPI.      PHYSICAL EXAM    I have reviewed the triage vital signs and nursing notes.    ED Triage Vitals   Temp Heart Rate Resp BP SpO2   03/22/25 2341 03/22/25 2341 03/22/25 2341 03/22/25 2344 03/22/25 2341   96.7 °F (35.9 °C) 113 16 146/94 98 %      Temp src Heart Rate Source Patient Position BP Location FiO2 (%)   03/22/25 2341 03/22/25 2341 03/22/25 2344 03/22/25 2344 --   Tympanic Monitor Sitting Right arm        Physical Exam  GENERAL: Cooperative and conversant obese male, alert, no acute distress  SKIN: Warm, dry  HENT: Normocephalic, atraumatic  EYES: no scleral icterus  RESPIRATORY: Relaxed breathing  MUSCULOSKELETAL: no deformity, no deltoid anesthesia on the right, limited range of motion of right shoulder due to pain, tenderness to palpation anterior shoulder and posterior shoulder over right trapezius muscle, full range of motion at right elbow and wrist with 2+ radial pulse  NEURO: alert, moves all extremities, follows commands                                                                   LAB RESULTS  No results found for this or any previous visit (from the past 24 hours).      RADIOLOGY  XR Shoulder 2+ View Right  Result Date: 3/23/2025  XR SHOULDER 2+ VW RIGHT-   HISTORY:   fall from ladder with right arm hung in a \"rope\" with ongoing pain X 1 week  TECHNIQUE:  Two views of the right shoulder.  FINDINGS:  Negative for acute fracture, dislocation, or radiopaque foreign body.       No evidence of " acute bony abnormality.  This report was finalized on 3/23/2025 12:27 AM by Dr. Alvin Carpenter M.D on Workstation: [id number]          MEDICATIONS GIVEN IN ER  Medications   HYDROcodone-acetaminophen (NORCO) 5-325 MG per tablet 1 tablet (has no administration in time range)   Lidocaine 4 % 1 patch (has no administration in time range)         ORDERS PLACED DURING THIS VISIT:  Orders Placed This Encounter   Procedures    XR Shoulder 2+ View Right         OUTPATIENT MEDICATION MANAGEMENT:  Current Facility-Administered Medications Ordered in Epic   Medication Dose Route Frequency Provider Last Rate Last Admin    HYDROcodone-acetaminophen (NORCO) 5-325 MG per tablet 1 tablet  1 tablet Oral Once Bethany Barrios MD        Lidocaine 4 % 1 patch  1 patch Transdermal Q24H Bethany Barrios MD         Current Outpatient Medications Ordered in Epic   Medication Sig Dispense Refill    atorvastatin (LIPITOR) 20 MG tablet Take 40 mg by mouth Daily.      Ertugliflozin L-PyroglutamicAc (Steglatro) 15 MG tablet Take 15 mg by mouth Every Morning.      HYDROcodone-acetaminophen (NORCO) 5-325 MG per tablet Take 1 tablet by mouth Every 6 (Six) Hours As Needed for Severe Pain. 8 tablet 0    ibuprofen (ADVIL,MOTRIN) 800 MG tablet Take 1 tablet by mouth Every 8 (Eight) Hours As Needed for Moderate Pain. 20 tablet 0    lidocaine (LIDODERM) 5 % Place 1 patch on the skin as directed by provider Daily. Remove & Discard patch within 12 hours or as directed by MD 15 each 0    LISINOPRIL PO Take 5 mg by mouth Daily.      metFORMIN (GLUCOPHAGE) 500 MG tablet Take 1 tablet by mouth 2 (Two) Times a Day With Meals. (Patient taking differently: Take 1,000 mg by mouth 2 (Two) Times a Day With Meals.) 20 tablet 0    omeprazole (priLOSEC) 20 MG capsule Take 20 mg by mouth Daily.      ondansetron (ZOFRAN) 4 MG tablet Take 1 tablet by mouth Every 6 (Six) Hours. (Patient taking differently: Take 4 mg by mouth 4 (Four) Times a Day As Needed.)  10 tablet 0         PROCEDURES  Procedures            PROGRESS, DATA ANALYSIS, CONSULTS, AND MEDICAL DECISION MAKING  All labs have been independently interpreted by me.  All radiology studies have been reviewed by me. All EKG's have been independently viewed and interpreted by me.  Discussion below represents my analysis of pertinent findings related to patient's condition, differential diagnosis, treatment plan and final disposition.    Differential diagnosis includes but is not limited to fracture, dislocation, AC joint separation, rotator cuff injury, malignancy, among many other possibilities.  Plan for x-ray of right shoulder and reassessment.                     AS OF 00:50 EDT VITALS:    BP - 146/94  HR - 88  TEMP - 96.7 °F (35.9 °C) (Tympanic)  O2 SATS - 97%      COMPLEXITY OF CARE  Admission was considered but after careful review of the patient's presentation, physical examination, diagnostic results, and response to treatment the patient may be safely discharged with outpatient follow-up.    DIAGNOSIS  Final diagnoses:   Strain of right shoulder, initial encounter   Sprain of right shoulder, unspecified shoulder sprain type, initial encounter         DISPOSITION  ED Disposition       ED Disposition   Discharge    Condition   Stable    Comment   --                Please note that portions of this document were completed with a voice recognition program.    Note Disclaimer: At [facility], we believe that sharing information builds trust and better relationships. You are receiving this note because you recently visited [facility]. It is possible you will see health information before a provider has talked with you about it. This kind of information can be easy to misunderstand. To help you fully understand what it means for your health, we urge you to discuss this note with your provider.         Bethany Barrios MD  03/23/25 0050

## 2025-03-23 NOTE — DISCHARGE INSTRUCTIONS
Rest at home avoiding any particularly strenuous activity today and through the weekend.     Eat small, frequent meals and drink plenty of fluids. Take any medication prescribed as instructed.     Monitor for any signs of recurrent symptoms or worsening and see your primary doctor to discuss your ER visit while returning to the ER if any concerns as we discussed.